# Patient Record
Sex: MALE | Race: BLACK OR AFRICAN AMERICAN | Employment: UNEMPLOYED | URBAN - METROPOLITAN AREA
[De-identification: names, ages, dates, MRNs, and addresses within clinical notes are randomized per-mention and may not be internally consistent; named-entity substitution may affect disease eponyms.]

---

## 2017-04-07 ENCOUNTER — LAB ENCOUNTER (OUTPATIENT)
Dept: LAB | Facility: HOSPITAL | Age: 37
End: 2017-04-07
Attending: FAMILY MEDICINE
Payer: MEDICARE

## 2017-04-07 ENCOUNTER — OFFICE VISIT (OUTPATIENT)
Dept: RHEUMATOLOGY | Facility: CLINIC | Age: 37
End: 2017-04-07

## 2017-04-07 VITALS
HEART RATE: 71 BPM | WEIGHT: 175 LBS | HEIGHT: 69 IN | BODY MASS INDEX: 25.92 KG/M2 | SYSTOLIC BLOOD PRESSURE: 122 MMHG | DIASTOLIC BLOOD PRESSURE: 85 MMHG

## 2017-04-07 DIAGNOSIS — M79.10 MYALGIA: ICD-10-CM

## 2017-04-07 DIAGNOSIS — M25.50 POLYARTHRALGIA: Primary | ICD-10-CM

## 2017-04-07 DIAGNOSIS — M25.50 POLYARTHRALGIA: ICD-10-CM

## 2017-04-07 PROCEDURE — 82550 ASSAY OF CK (CPK): CPT

## 2017-04-07 PROCEDURE — 82085 ASSAY OF ALDOLASE: CPT

## 2017-04-07 PROCEDURE — 36415 COLL VENOUS BLD VENIPUNCTURE: CPT

## 2017-04-07 PROCEDURE — 86140 C-REACTIVE PROTEIN: CPT

## 2017-04-07 PROCEDURE — 86039 ANTINUCLEAR ANTIBODIES (ANA): CPT

## 2017-04-07 PROCEDURE — 85652 RBC SED RATE AUTOMATED: CPT

## 2017-04-07 PROCEDURE — G0463 HOSPITAL OUTPT CLINIC VISIT: HCPCS | Performed by: INTERNAL MEDICINE

## 2017-04-07 PROCEDURE — 99204 OFFICE O/P NEW MOD 45 MIN: CPT | Performed by: INTERNAL MEDICINE

## 2017-04-07 PROCEDURE — 86235 NUCLEAR ANTIGEN ANTIBODY: CPT

## 2017-04-07 PROCEDURE — 86038 ANTINUCLEAR ANTIBODIES: CPT

## 2017-04-07 RX ORDER — CLINDAMYCIN PHOSPHATE 10 MG/G
GEL TOPICAL
COMMUNITY
Start: 2017-03-21

## 2017-04-07 RX ORDER — CALCIUM CARBONATE 500(1250)
500 TABLET ORAL DAILY
COMMUNITY

## 2017-04-07 RX ORDER — NYSTATIN 100000 [USP'U]/G
POWDER TOPICAL
COMMUNITY
Start: 2017-01-16

## 2017-04-07 RX ORDER — MULTIVIT-MIN/IRON/FOLIC ACID/K 18-600-40
CAPSULE ORAL DAILY
COMMUNITY

## 2017-04-07 RX ORDER — TRAMADOL HYDROCHLORIDE 50 MG/1
TABLET ORAL 2 TIMES DAILY
COMMUNITY
Start: 2017-03-22

## 2017-04-07 RX ORDER — DOXYCYCLINE HYCLATE 100 MG
TABLET ORAL 2 TIMES DAILY
COMMUNITY
Start: 2017-03-20

## 2017-04-07 RX ORDER — MAG HYDROX/ALUMINUM HYD/SIMETH 400-400-40
SUSPENSION, ORAL (FINAL DOSE FORM) ORAL DAILY
COMMUNITY

## 2017-04-07 NOTE — PATIENT INSTRUCTIONS
1. Check labs- NUSRAT, SSA , SSB  2. Consider gabapentin and lyrica for him   3. Will discuss with dr. Vel Ruby -   4.  Return to clinic in 1month-

## 2017-04-07 NOTE — PROGRESS NOTES
Belkis Stratton is a 31year old male who presents for Patient presents with:  Leg Pain: lower  . HPI:     I had the pleasure of seeing Belkis Stratton on 4/7/2017 for evaluation. He is  A pleasant 31year old who has multiple sclerosis.  He was diagnos 11      History reviewed. No pertinent past medical history. History reviewed. No pertinent past surgical history.    Family History   Problem Relation Age of Onset   • Diabetes Mother    • MS[other] Tabitha Gentle Cousin    • MS[other] [OTHER] Maternal Aunt in hips flexores -   No classic tender points -   EXTREMITIES: no cyanosis, clubbing or edema  NEURO: intact touch, 4/5 ue and le strength    ASSESSMENT AND PLAN:   Roxy Duncan is a 31year old male who presents for Patient presents with:  Leg Pain: low

## 2017-04-13 ENCOUNTER — TELEPHONE (OUTPATIENT)
Dept: RHEUMATOLOGY | Facility: CLINIC | Age: 37
End: 2017-04-13

## 2017-04-14 NOTE — TELEPHONE ENCOUNTER
Left message again for dr. Norris- [phone] - with Amaury Perea test results   - and to ask what the key question was for the consult. Please fax Ed rios's labs to PCP at [phone]  [facility] to fax lab letter to her.

## 2017-05-03 ENCOUNTER — OFFICE VISIT (OUTPATIENT)
Dept: RHEUMATOLOGY | Facility: CLINIC | Age: 37
End: 2017-05-03

## 2017-05-03 VITALS
HEART RATE: 84 BPM | SYSTOLIC BLOOD PRESSURE: 120 MMHG | HEIGHT: 69 IN | WEIGHT: 175 LBS | BODY MASS INDEX: 25.92 KG/M2 | DIASTOLIC BLOOD PRESSURE: 75 MMHG

## 2017-05-03 DIAGNOSIS — G35 MS (MULTIPLE SCLEROSIS) (HCC): ICD-10-CM

## 2017-05-03 DIAGNOSIS — M79.672 PAIN IN BOTH FEET: Primary | ICD-10-CM

## 2017-05-03 DIAGNOSIS — M79.671 PAIN IN BOTH FEET: Primary | ICD-10-CM

## 2017-05-03 DIAGNOSIS — R76.8 POSITIVE ANA (ANTINUCLEAR ANTIBODY): ICD-10-CM

## 2017-05-03 PROCEDURE — G0463 HOSPITAL OUTPT CLINIC VISIT: HCPCS | Performed by: INTERNAL MEDICINE

## 2017-05-03 PROCEDURE — 99214 OFFICE O/P EST MOD 30 MIN: CPT | Performed by: INTERNAL MEDICINE

## 2017-05-03 RX ORDER — GABAPENTIN 300 MG/1
300 CAPSULE ORAL NIGHTLY
Qty: 90 CAPSULE | Refills: 1 | Status: SHIPPED | OUTPATIENT
Start: 2017-05-03 | End: 2017-08-01

## 2017-05-03 NOTE — PROGRESS NOTES
Carlos Alberto Glynn is a 31year old male who presents for Patient presents with:  Joint Pain: leg pain  . HPI:     I had the pleasure of seeing Carlos Alberto Glynn on 4/7/2017 for evaluation. He is  A pleasant 31year old who has multiple sclerosis.  He was [facility] on gabpentin and lyrica before but he is not sure how much it helped him. He felt his toe pain got better a couple of months ago. He started losing more function in 2/2008.    The toe pain has been going on for a while but it hasn't always been going on Onset   • Diabetes Mother    • MS[other] Otto Nathan Cousin    • MS[other] Otto Jac Maternal Aunt    • gout[other] Otto Jac Maternal Aunt    • LUPUS[other] [OTHER] Cousin       Social History:    Smoking Status: Current Every Day Smoker        Packs/Day: 0.00  Y A      Negative Negative   Anti-Sjogren's B      Negative Negative   NUSRAT SCREEN      Negative Moderate Positive (A)   CK       U/L 61   ALDOLASE, SERUM      1.5-8.1 U/L 5.8   C-REACTIVE PROTEIN      0.0-0.9 mg/dL 2.1 (H)   SED RATE      0-15 mm/Hr 13

## 2017-05-03 NOTE — PATIENT INSTRUCTIONS
1. Start gabpentin 300mg at night - ok to titrate to twice a day or three times a day - per dr. Tobin Ross   2. Return to clinic in 6 months or as needed. Gabapentin capsules or tablets  What is this medicine?   GABAPENTIN (GA ba pen tin) is used to [employer] This medicine may also interact with the following medications:  · alcohol  · antacids  · antihistamines for allergy, cough and cold  · certain medicines for anxiety or sleep  · certain medicines for depression or psychotic disturbances  · homatropine; hyd Visit your doctor or health care professional for regular checks on your progress. You may want to keep a record at home of how you feel your condition is responding to treatment.  You may want to share this information with your doctor or health care profe NOTE:This sheet is a summary. It may not cover all possible information. If you have questions about this medicine, talk to your doctor, pharmacist, or health care provider.  Copyright© 2016 [employer]

## 2017-10-09 ENCOUNTER — NEW PATIENT (OUTPATIENT)
Dept: URBAN - METROPOLITAN AREA CLINIC 30 | Facility: CLINIC | Age: 37
End: 2017-10-09
Payer: COMMERCIAL

## 2017-10-09 DIAGNOSIS — H46.9 UNSPECIFIED OPTIC NEURITIS: Primary | ICD-10-CM

## 2017-10-09 DIAGNOSIS — H43.813 VITREOUS DEGENERATION, BILATERAL: ICD-10-CM

## 2017-10-09 PROCEDURE — 92134 CPTRZ OPH DX IMG PST SGM RTA: CPT | Performed by: OPHTHALMOLOGY

## 2017-10-09 PROCEDURE — 92004 COMPRE OPH EXAM NEW PT 1/>: CPT | Performed by: OPHTHALMOLOGY

## 2017-10-09 ASSESSMENT — INTRAOCULAR PRESSURE
OS: 11
OD: 10

## 2018-05-30 ENCOUNTER — FOLLOW UP ESTABLISHED (OUTPATIENT)
Dept: URBAN - METROPOLITAN AREA CLINIC 30 | Facility: CLINIC | Age: 38
End: 2018-05-30
Payer: COMMERCIAL

## 2018-05-30 DIAGNOSIS — H47.293 OTHER OPTIC ATROPHY, BILATERAL: Primary | ICD-10-CM

## 2018-05-30 PROCEDURE — 92004 COMPRE OPH EXAM NEW PT 1/>: CPT | Performed by: OPTOMETRIST

## 2018-05-30 PROCEDURE — 92133 CPTRZD OPH DX IMG PST SGM ON: CPT | Performed by: OPTOMETRIST

## 2018-05-30 ASSESSMENT — VISUAL ACUITY
OS: 20/400
OD: 20/300

## 2018-05-30 ASSESSMENT — INTRAOCULAR PRESSURE
OS: 13
OD: 11

## 2019-04-01 ENCOUNTER — OFFICE VISIT (OUTPATIENT)
Dept: URBAN - METROPOLITAN AREA CLINIC 40 | Facility: CLINIC | Age: 39
End: 2019-04-01
Payer: COMMERCIAL

## 2019-04-01 DIAGNOSIS — G35 MULTIPLE SCLEROSIS: ICD-10-CM

## 2019-04-01 DIAGNOSIS — H55.09 OTHER FORM OF NYSTAGMUS: Primary | ICD-10-CM

## 2019-04-01 PROCEDURE — 99204 OFFICE O/P NEW MOD 45 MIN: CPT | Performed by: OPTOMETRIST

## 2019-04-01 ASSESSMENT — INTRAOCULAR PRESSURE
OD: 17
OS: 16

## 2019-04-01 NOTE — IMPRESSION/PLAN
Impression: Multiple sclerosis: G35. Plan: Discussed diagnosis in detail with patient. Will continue to observe condition and or symptoms.

## 2019-04-01 NOTE — IMPRESSION/PLAN
Impression: Other form of nystagmus: H55.09. Plan: Discussed diagnosis in detail with patient. Will continue to observe condition and or symptoms.

## 2021-05-27 ENCOUNTER — HOSPITAL ENCOUNTER (OUTPATIENT)
Facility: CLINIC | Age: 41
Setting detail: OBSERVATION
Discharge: NURSING, REHAB OR LONG-TERM CARE | End: 2021-06-21
Attending: EMERGENCY MEDICINE | Admitting: INTERNAL MEDICINE
Payer: COMMERCIAL

## 2021-05-27 DIAGNOSIS — G35 MULTIPLE SCLEROSIS (H): ICD-10-CM

## 2021-05-27 LAB
ALBUMIN UR-MCNC: 100 MG/DL
ANION GAP SERPL CALCULATED.3IONS-SCNC: 5 MMOL/L (ref 3–14)
APPEARANCE UR: ABNORMAL
BASOPHILS # BLD AUTO: 0 10E9/L (ref 0–0.2)
BASOPHILS NFR BLD AUTO: 0.6 %
BILIRUB UR QL STRIP: NEGATIVE
BUN SERPL-MCNC: 11 MG/DL (ref 7–30)
CALCIUM SERPL-MCNC: 9.5 MG/DL (ref 8.5–10.1)
CHLORIDE SERPL-SCNC: 102 MMOL/L (ref 94–109)
CO2 SERPL-SCNC: 31 MMOL/L (ref 20–32)
COLOR UR AUTO: YELLOW
CREAT SERPL-MCNC: 0.98 MG/DL (ref 0.66–1.25)
DIFFERENTIAL METHOD BLD: ABNORMAL
EOSINOPHIL # BLD AUTO: 0.5 10E9/L (ref 0–0.7)
EOSINOPHIL NFR BLD AUTO: 11.2 %
ERYTHROCYTE [DISTWIDTH] IN BLOOD BY AUTOMATED COUNT: 18.3 % (ref 10–15)
GFR SERPL CREATININE-BSD FRML MDRD: >90 ML/MIN/{1.73_M2}
GLUCOSE SERPL-MCNC: 66 MG/DL (ref 70–99)
GLUCOSE UR STRIP-MCNC: NEGATIVE MG/DL
HCT VFR BLD AUTO: 54.9 % (ref 40–53)
HGB BLD-MCNC: 16.8 G/DL (ref 13.3–17.7)
HGB UR QL STRIP: ABNORMAL
HYALINE CASTS #/AREA URNS LPF: 2 /LPF (ref 0–2)
IMM GRANULOCYTES # BLD: 0 10E9/L (ref 0–0.4)
IMM GRANULOCYTES NFR BLD: 0.9 %
INTERPRETATION ECG - MUSE: NORMAL
KETONES UR STRIP-MCNC: NEGATIVE MG/DL
LABORATORY COMMENT REPORT: NORMAL
LEUKOCYTE ESTERASE UR QL STRIP: ABNORMAL
LYMPHOCYTES # BLD AUTO: 0.5 10E9/L (ref 0.8–5.3)
LYMPHOCYTES NFR BLD AUTO: 11.6 %
MCH RBC QN AUTO: 25.5 PG (ref 26.5–33)
MCHC RBC AUTO-ENTMCNC: 30.6 G/DL (ref 31.5–36.5)
MCV RBC AUTO: 83 FL (ref 78–100)
MONOCYTES # BLD AUTO: 0.4 10E9/L (ref 0–1.3)
MONOCYTES NFR BLD AUTO: 8.6 %
MUCOUS THREADS #/AREA URNS LPF: PRESENT /LPF
NEUTROPHILS # BLD AUTO: 3.1 10E9/L (ref 1.6–8.3)
NEUTROPHILS NFR BLD AUTO: 67.1 %
NITRATE UR QL: NEGATIVE
NRBC # BLD AUTO: 0 10*3/UL
NRBC BLD AUTO-RTO: 0 /100
NT-PROBNP SERPL-MCNC: 17 PG/ML (ref 0–450)
PH UR STRIP: 5.5 PH (ref 5–7)
PLATELET # BLD AUTO: 373 10E9/L (ref 150–450)
POTASSIUM SERPL-SCNC: 3.8 MMOL/L (ref 3.4–5.3)
RBC # BLD AUTO: 6.59 10E12/L (ref 4.4–5.9)
RBC #/AREA URNS AUTO: >182 /HPF (ref 0–2)
SARS-COV-2 RNA RESP QL NAA+PROBE: NEGATIVE
SODIUM SERPL-SCNC: 138 MMOL/L (ref 133–144)
SOURCE: ABNORMAL
SP GR UR STRIP: 1.03 (ref 1–1.03)
SPECIMEN SOURCE: NORMAL
SQUAMOUS #/AREA URNS AUTO: <1 /HPF (ref 0–1)
UROBILINOGEN UR STRIP-MCNC: 0 MG/DL (ref 0–2)
WBC # BLD AUTO: 4.7 10E9/L (ref 4–11)
WBC #/AREA URNS AUTO: >182 /HPF (ref 0–5)
WBC CLUMPS #/AREA URNS HPF: PRESENT /HPF

## 2021-05-27 PROCEDURE — 99285 EMERGENCY DEPT VISIT HI MDM: CPT | Mod: 25

## 2021-05-27 PROCEDURE — 99219 PR INITIAL OBSERVATION CARE,LEVEL II: CPT | Performed by: INTERNAL MEDICINE

## 2021-05-27 PROCEDURE — G0378 HOSPITAL OBSERVATION PER HR: HCPCS

## 2021-05-27 PROCEDURE — 83880 ASSAY OF NATRIURETIC PEPTIDE: CPT | Performed by: EMERGENCY MEDICINE

## 2021-05-27 PROCEDURE — 81001 URINALYSIS AUTO W/SCOPE: CPT | Performed by: EMERGENCY MEDICINE

## 2021-05-27 PROCEDURE — 85025 COMPLETE CBC W/AUTO DIFF WBC: CPT | Performed by: EMERGENCY MEDICINE

## 2021-05-27 PROCEDURE — 250N000013 HC RX MED GY IP 250 OP 250 PS 637: Performed by: INTERNAL MEDICINE

## 2021-05-27 PROCEDURE — 80048 BASIC METABOLIC PNL TOTAL CA: CPT | Performed by: EMERGENCY MEDICINE

## 2021-05-27 PROCEDURE — G0463 HOSPITAL OUTPT CLINIC VISIT: HCPCS

## 2021-05-27 PROCEDURE — 93005 ELECTROCARDIOGRAM TRACING: CPT

## 2021-05-27 PROCEDURE — C9803 HOPD COVID-19 SPEC COLLECT: HCPCS

## 2021-05-27 PROCEDURE — 87635 SARS-COV-2 COVID-19 AMP PRB: CPT | Performed by: EMERGENCY MEDICINE

## 2021-05-27 RX ORDER — LEVETIRACETAM 500 MG/1
500 TABLET ORAL EVERY 12 HOURS
COMMUNITY

## 2021-05-27 RX ORDER — AMOXICILLIN 250 MG
1 CAPSULE ORAL 2 TIMES DAILY PRN
Status: DISCONTINUED | OUTPATIENT
Start: 2021-05-27 | End: 2021-06-21 | Stop reason: HOSPADM

## 2021-05-27 RX ORDER — POTASSIUM CHLORIDE 750 MG/1
10 CAPSULE, EXTENDED RELEASE ORAL DAILY
Status: DISCONTINUED | OUTPATIENT
Start: 2021-05-27 | End: 2021-05-27

## 2021-05-27 RX ORDER — DOXYCYCLINE 100 MG/1
100 CAPSULE ORAL DAILY
Status: DISCONTINUED | OUTPATIENT
Start: 2021-05-27 | End: 2021-06-12

## 2021-05-27 RX ORDER — AMOXICILLIN 250 MG
2 CAPSULE ORAL 2 TIMES DAILY PRN
Status: DISCONTINUED | OUTPATIENT
Start: 2021-05-27 | End: 2021-06-21 | Stop reason: HOSPADM

## 2021-05-27 RX ORDER — LEVETIRACETAM 500 MG/1
500 TABLET ORAL EVERY 12 HOURS
Status: DISCONTINUED | OUTPATIENT
Start: 2021-05-27 | End: 2021-06-06

## 2021-05-27 RX ORDER — FUROSEMIDE 20 MG
20 TABLET ORAL DAILY
Status: DISCONTINUED | OUTPATIENT
Start: 2021-05-27 | End: 2021-06-21 | Stop reason: HOSPADM

## 2021-05-27 RX ORDER — NALOXONE HYDROCHLORIDE 0.4 MG/ML
0.4 INJECTION, SOLUTION INTRAMUSCULAR; INTRAVENOUS; SUBCUTANEOUS
Status: DISCONTINUED | OUTPATIENT
Start: 2021-05-27 | End: 2021-06-21 | Stop reason: HOSPADM

## 2021-05-27 RX ORDER — DOXYCYCLINE 100 MG/1
100 CAPSULE ORAL DAILY
COMMUNITY

## 2021-05-27 RX ORDER — BACLOFEN 10 MG/1
10 TABLET ORAL 2 TIMES DAILY
Status: ON HOLD | COMMUNITY
End: 2021-06-21

## 2021-05-27 RX ORDER — OXYCODONE HYDROCHLORIDE 5 MG/1
5-10 TABLET ORAL
Status: DISCONTINUED | OUTPATIENT
Start: 2021-05-27 | End: 2021-06-10

## 2021-05-27 RX ORDER — LACTULOSE 10 G/15ML
30 SOLUTION ORAL; RECTAL DAILY
COMMUNITY

## 2021-05-27 RX ORDER — ACETAMINOPHEN 650 MG/1
650 SUPPOSITORY RECTAL EVERY 4 HOURS PRN
Status: DISCONTINUED | OUTPATIENT
Start: 2021-05-27 | End: 2021-06-21 | Stop reason: HOSPADM

## 2021-05-27 RX ORDER — NAPROXEN 500 MG/1
500 TABLET ORAL EVERY 12 HOURS PRN
COMMUNITY

## 2021-05-27 RX ORDER — DULOXETIN HYDROCHLORIDE 30 MG/1
30 CAPSULE, DELAYED RELEASE ORAL DAILY
Status: DISCONTINUED | OUTPATIENT
Start: 2021-05-27 | End: 2021-06-21 | Stop reason: HOSPADM

## 2021-05-27 RX ORDER — FINGOLIMOD HYDROCHLORIDE 0.5 MG/1
0.5 CAPSULE ORAL DAILY
Status: DISCONTINUED | OUTPATIENT
Start: 2021-05-27 | End: 2021-06-21 | Stop reason: HOSPADM

## 2021-05-27 RX ORDER — ONDANSETRON 4 MG/1
4 TABLET, ORALLY DISINTEGRATING ORAL EVERY 6 HOURS PRN
Status: DISCONTINUED | OUTPATIENT
Start: 2021-05-27 | End: 2021-06-21 | Stop reason: HOSPADM

## 2021-05-27 RX ORDER — NALOXONE HYDROCHLORIDE 0.4 MG/ML
0.2 INJECTION, SOLUTION INTRAMUSCULAR; INTRAVENOUS; SUBCUTANEOUS
Status: DISCONTINUED | OUTPATIENT
Start: 2021-05-27 | End: 2021-06-21 | Stop reason: HOSPADM

## 2021-05-27 RX ORDER — GABAPENTIN 600 MG/1
1200 TABLET ORAL EVERY 8 HOURS
Status: DISCONTINUED | OUTPATIENT
Start: 2021-05-27 | End: 2021-06-02

## 2021-05-27 RX ORDER — FUROSEMIDE 20 MG
20 TABLET ORAL DAILY
COMMUNITY

## 2021-05-27 RX ORDER — ONDANSETRON 2 MG/ML
4 INJECTION INTRAMUSCULAR; INTRAVENOUS EVERY 6 HOURS PRN
Status: DISCONTINUED | OUTPATIENT
Start: 2021-05-27 | End: 2021-06-21 | Stop reason: HOSPADM

## 2021-05-27 RX ORDER — OXYCODONE HYDROCHLORIDE 5 MG/1
5 TABLET ORAL 3 TIMES DAILY
Status: ON HOLD | COMMUNITY
End: 2021-06-21

## 2021-05-27 RX ORDER — POTASSIUM CHLORIDE 750 MG/1
10 TABLET, EXTENDED RELEASE ORAL DAILY
Status: DISCONTINUED | OUTPATIENT
Start: 2021-05-27 | End: 2021-06-21 | Stop reason: HOSPADM

## 2021-05-27 RX ORDER — POTASSIUM CHLORIDE 750 MG/1
10 CAPSULE, EXTENDED RELEASE ORAL DAILY
Status: ON HOLD | COMMUNITY
End: 2021-06-21

## 2021-05-27 RX ORDER — NALTREXONE HYDROCHLORIDE 50 MG/1
4.5 TABLET, FILM COATED ORAL DAILY
Status: ON HOLD | COMMUNITY
End: 2021-05-27

## 2021-05-27 RX ORDER — BACLOFEN 10 MG/1
10 TABLET ORAL 2 TIMES DAILY
Status: DISCONTINUED | OUTPATIENT
Start: 2021-05-27 | End: 2021-06-05

## 2021-05-27 RX ORDER — FINGOLIMOD HCL 0.5 MG/1
0.5 CAPSULE ORAL DAILY
COMMUNITY

## 2021-05-27 RX ORDER — LACTULOSE 10 G/15ML
30 SOLUTION ORAL DAILY
Status: DISCONTINUED | OUTPATIENT
Start: 2021-05-27 | End: 2021-06-21 | Stop reason: HOSPADM

## 2021-05-27 RX ORDER — GABAPENTIN 600 MG/1
1200 TABLET ORAL EVERY 8 HOURS
Status: ON HOLD | COMMUNITY
End: 2021-06-21

## 2021-05-27 RX ORDER — OXYCODONE HYDROCHLORIDE 5 MG/1
5 TABLET ORAL 3 TIMES DAILY
Status: DISCONTINUED | OUTPATIENT
Start: 2021-05-27 | End: 2021-06-21 | Stop reason: HOSPADM

## 2021-05-27 RX ORDER — ACETAMINOPHEN 325 MG/1
650 TABLET ORAL EVERY 4 HOURS PRN
Status: DISCONTINUED | OUTPATIENT
Start: 2021-05-27 | End: 2021-06-21 | Stop reason: HOSPADM

## 2021-05-27 RX ORDER — BISACODYL 10 MG
10 SUPPOSITORY, RECTAL RECTAL DAILY PRN
Status: DISCONTINUED | OUTPATIENT
Start: 2021-05-27 | End: 2021-06-21 | Stop reason: HOSPADM

## 2021-05-27 RX ORDER — DULOXETIN HYDROCHLORIDE 30 MG/1
30 CAPSULE, DELAYED RELEASE ORAL DAILY
COMMUNITY

## 2021-05-27 RX ADMIN — OXYCODONE HYDROCHLORIDE 5 MG: 5 TABLET ORAL at 15:57

## 2021-05-27 RX ADMIN — FINGOLIMOD HCL 0.5 MG: 0.5 CAPSULE ORAL at 17:23

## 2021-05-27 RX ADMIN — FUROSEMIDE 20 MG: 20 TABLET ORAL at 15:57

## 2021-05-27 RX ADMIN — DOXYCYCLINE 100 MG: 100 CAPSULE ORAL at 15:57

## 2021-05-27 RX ADMIN — OXYCODONE HYDROCHLORIDE 5 MG: 5 TABLET ORAL at 21:18

## 2021-05-27 RX ADMIN — DULOXETINE HYDROCHLORIDE 30 MG: 30 CAPSULE, DELAYED RELEASE PELLETS ORAL at 15:57

## 2021-05-27 RX ADMIN — BACLOFEN 10 MG: 10 TABLET ORAL at 21:17

## 2021-05-27 RX ADMIN — GABAPENTIN 1200 MG: 600 TABLET, FILM COATED ORAL at 17:23

## 2021-05-27 RX ADMIN — LACTULOSE 30 ML: 10 SOLUTION ORAL at 17:22

## 2021-05-27 RX ADMIN — GABAPENTIN 1200 MG: 600 TABLET, FILM COATED ORAL at 23:52

## 2021-05-27 RX ADMIN — ACETAMINOPHEN 650 MG: 325 TABLET, FILM COATED ORAL at 14:38

## 2021-05-27 RX ADMIN — POTASSIUM CHLORIDE 10 MEQ: 750 TABLET, EXTENDED RELEASE ORAL at 17:23

## 2021-05-27 RX ADMIN — LEVETIRACETAM 500 MG: 500 TABLET, FILM COATED ORAL at 17:23

## 2021-05-27 SDOH — HEALTH STABILITY: MENTAL HEALTH: HOW OFTEN DO YOU HAVE A DRINK CONTAINING ALCOHOL?: NEVER

## 2021-05-27 ASSESSMENT — ENCOUNTER SYMPTOMS
NAUSEA: 0
DIARRHEA: 0
CONSTIPATION: 0
WOUND: 1
FEVER: 0
BLOOD IN STOOL: 0
VOMITING: 0
COUGH: 0
MYALGIAS: 1

## 2021-05-27 NOTE — PLAN OF CARE
Patient is alert and oriented x4.  Came form [place], was brought here from airport for complains of generalized pain and waiting for nursing home placement. PIV on the left WDL. Skin check done and skin issues documented and verified by another nurse.New FC   form ER, tip of the penis has bloody/ yellowish (pus like) drainage. Wipe all over the body, patient smells bad and used up 3 packs of wipes. Skin is dirty and wipes turns to black after few wipes to the body. Total cares, A2 with lift. Needs to be repositioned  Q2 hours to prevent further skin breakdown.  Pleasant and cooperative. To continue to monitor.

## 2021-05-27 NOTE — PROGRESS NOTES
RECEIVING UNIT ED HANDOFF REVIEW    ED Nurse Handoff Report was reviewed by: Ekta Orozco RN on May 27, 2021 at 4:01 AM

## 2021-05-27 NOTE — CONSULTS
[facility] Nurse Inpatient Wound Assessment     Reason for consultation: Evaluate and treat coccyx, posterior thighs, heels, left calf, penile meatus      Assessment  Coccyx: basically intact, with scattered superficial friction/shear injuries that are mostly resurfaced.  There is likely pressure component as well, but no obvious pressure injuries present at this time.      Posterior upper thighs: superficial shearing injuries likely from briefs, L>R  Right heel: Stage 2 vs healing Stage 3 CAPI; shallow and clean  Left heel: intact, newly resurfaced tissue, evidence of prior pressure injury  Left lateral calf: old trauma injury that reopens per pt; scarred with minimal open tissue at present  Penile meatus: mild enlargement of meatal opening from chronic catheter; no wounds present but there is moderate yellowish drainage coming from meatus    Pt has MS and is wheel-chair bound.  At risk for further skin breakdown.  Consider Pulsate mattress if extended stay expected.  Prevalon boots ordered for heels.     Treatment Plan  Coccyx/sacrum: every other day and prn:  1.  Swab skin with Cavilon no-sting barrier wipes, let dry  2.  Apply Mepilex Sacral, stay well above perianal area  3.  PIP measures. Consider Pulsate mattress if extended stay expected or if skin deteriorates.     Posterior thighs: every other day and prn until healed:  1.  Cleanse with MicroKlenz  2.  Mepilex 4x4s      Left calf: every 3 days and prn:   1.  Cleanse with MicroKlenz  2.  Apply Optifoam Gentle Border foam dressing    Right heel: every other day and prn:  1.  Cleanse with MicroKlenz  2.  Swab periwound with skin prep, let dry  3.  Apply small piece of Aquacel Ag to open area  4.  Cover with Mepilex 4x4    Left heel:  Cleanse with bath wipes, then apply Sween 24 cream.  Apply Prevalon boots to bilateral heels when in bed.     Penile meatus: no wound cares needed; continue meticulous catheter and perineal cares  and ensure catheter is secured appropriately, not pulling on meatus    Orders Written  Recommended provider order: None, at this time  WOC Nurse follow-up plan: weekly  Nursing to notify the Provider(s) and re-consult the WOC Nurse if wound(s) deteriorates or new skin concern.    Patient History  According to provider note(s):  Rahul Ruffin is a 41 year old male who presents with need for placement     MS  Total cares, was living in care facility in [place] but wants to be placed here. Just got off plane and came to ED for placement. Reports no acute issues. Labs normal.  Mom says patient usually gets care at [facility] and [place].  I cannot access any records from him through epic.    Objective Data  Containment of urine/stool: Incontinence Protocol, Brief and Indwelling catheter    Active Diet Order:  Orders Placed This Encounter      Mechanical/Dental Soft Diet    Output:   No intake/output data recorded.    Risk Assessment:   Sensory Perception: 2-->very limited  Moisture: 4-->rarely moist  Activity: 2-->chairfast  Mobility: 2-->very limited  Nutrition: 3-->adequate  Friction and Shear: 1-->problem  Nick Score: 14                          Labs:   Recent Labs   Lab 05/27/21  0133   HGB 16.8   WBC 4.7       Physical Exam  Skin inspection: focused coccyx, posterior thighs, lower legs, heels, penis    Wound Location:  Coccyx   Date of last photo:  5-27-21      Wound History: per report, pt with poor hygiene on admission.  Pt bed/chair bound but can help with turns, needs assist x 1-2.  Chronic payne in place but incontinent of stool.   Measurements (length x width x depth, in cm): scattered pink areas 0.5cm or less, no depth  Wound Base: pink fragile newly resurfaced tissue  Palpation of the wound bed: normal   Periwound skin: intact, darker pigmentation to buttocks, dry and flaky  Color: normal to darker brown  Temperature: normal   Drainage: none  Odor: none   Pain: denies      Wound Location:   Posterior thighs  Date of last photo:  5-27-21      Wound History: present on admission, likely from briefs  Measurements (length x width x depth, in cm):  approx 4 x 1 x 0.1cm to left thigh, 1 x 3 x 0cm to right thigh  Wound Base: pink semi-moist tissue  Palpation of the wound bed: normal   Periwound skin: intact  Color: normal and consistent with surrounding tissue  Temperature: normal   Drainage: scant  Description of drainage: serosanguinous  Odor: none  Pain: minimal    Wound Location:  Right heel  Date of last photo:  5-27-21      Wound History: present on admission.  Pt can slightly move legs but cannot easily reposition them on his own.    Measurements (length x width x depth, in cm):  approx 2 x 1.5 x 0.2cm open area and 4 x 5cm general healing area/ debrided prior blister  Wound Base: pink-red moist friable  Tunneling: N/A  Undermining: N/A  Palpation of the wound bed: normal   Periwound skin: pink newly resurfaced tissue, remnants of blistered tissue at edges  Color: pink  Temperature: normal   Drainage: small  Description of drainage: bloody  Odor: none  Pain: mild      Wound Location:  Left heel  Date of last photo:  5-27-21      Wound History: present on admission  Measurements (length x width x depth, in cm): approx 2 x 2cm intact  Wound Base: intact scabbed/calloused firm tissue, looks newly healed over  Tunneling: N/A  Undermining: N/A  Palpation of the wound bed: firm   Periwound skin: flaky, scabby, dry, peely  Color: normal and consistent with surrounding tissue  Temperature: normal   Drainage: none  Odor: none  Pain: denies      Wound Location:  Left proximal posterior/lateral calf  Date of last photo:  5-27-21      Wound History: pt states is from minor trauma some years ago and area has had trouble staying healed  Measurements (length x width x depth, in cm):  approx 5 x 5cm, mostly intact, scant areas of depth 0.1cm  Wound Base: mostly pink-purple-brown scar tissue with scant pink moist  tissue  Palpation of the wound bed: normal   Periwound skin: dry/scaly  Color: normal and consistent with surrounding tissue  Temperature: normal   Drainage: scant  Description of drainage: serosanguinous  Odor: none  Pain: minimal    Interventions  Current support surface: Standard  Atmos Air mattress; consider Pulsate if extended stay or if skin deteriorates  Current off-loading measures: Pillows    Visual inspection of wound(s) completed  Wound Care: done per plan of care  Supplies: reviewed; ordered Prevalon boots  Education provided: importance of repositioning, plan of care, wound progress, Infection prevention , Moisture management, Hygiene and Off-loading pressure  Discussed plan of care with Patient and Nurse    Annette Dove RN, CWOCN

## 2021-05-27 NOTE — ED PROVIDER NOTES
History   Chief Complaint:  Foot Swelling     HPI   Rahul Ruffin is a 41 year old male with history of MS with an indwelling payne who presents with foot swelling. The patient reports bilateral foot swelling for the past 8 months, with increased pain to his feet tonight. He also notes some body aches, and chronic wounds to his upper left lateral calf and right heel, as well as cloudy urine he states is baseline for him. His mother reports he currently resides in a nursing home in [place], and now wants to be placed in a home here. They just flew in today, and were told the best way to accomplish this placement would be through the ED. They deny any fever, cough, nausea, vomiting, or abnormal BM. He is fully vaccinated against COVID. Denies any recent medication changes. No history of heart issues. No alcohol or drug use.    Review of Systems   Constitutional: Negative for fever.   Respiratory: Negative for cough.    Cardiovascular: Positive for leg swelling (foot).   Gastrointestinal: Negative for blood in stool, constipation, diarrhea, nausea and vomiting.   Genitourinary:        Cloudy urine   Musculoskeletal: Positive for myalgias.   Skin: Positive for wound.   All other systems reviewed and are negative.      Allergies:  The patient has no known allergies.     Medications:  Gabapentin  Naprosyn  Oxycodone  Keppra  Doxycycline   Gilenya    Past Medical History:    Trichomonal cystitis  Cognitive deficits  MS  Nystagmus   Ataxia     Social History:  Patient presents with mother at bedside.  No alcohol or drug use.    Physical Exam     Patient Vitals for the past 24 hrs:   BP Temp Pulse Resp SpO2   05/27/21 0300 (!) 129/95 -- 65 -- 100 %   05/27/21 0200 (!) 129/105 -- 65 -- 99 %   05/27/21 0154 -- -- -- 12 --   05/27/21 0152 -- -- -- -- 98 %   05/27/21 0150 (!) 136/106 -- 65 -- --   05/27/21 0100 (!) 142/107 98.1  F (36.7  C) 73 -- --       Physical Exam  SKIN:  Warm, dry.  Bandage skin ulcerations of the bilateral  lower extremities at the left lateral upper calf and the other of the right heel.  No erythema about these skin ulcerations/lesions.  HEMATOLOGIC/IMMUNOLOGIC/LYMPHATIC:  No pallor.  Bilateral lower extremity edema at the feet.  No lymphangitic streaking of the lower extremities.  EYES:  Conjunctivae normal.  CARDIOVASCULAR:  Regular rate and rhythm.  No murmur.  RESPIRATORY:  No respiratory distress, breath sounds equal and normal.  GASTROINTESTINAL:  Soft, nontender abdomen with active bowel sounds.  No distention.  GENITOURINARY: Indwelling Helm catheter draining cloudy yellow urine with debris within the catheter.  No clots or blood.  MUSCULOSKELETAL: Normal body habitus.  NEUROLOGIC:  Alert, conversant.  PSYCHIATRIC:  Normal mood.    Emergency Department Course     ECG  ECG taken at 0214, ECG read at 0248  NSR, Possible Anterior infarct, age undetermined    Rate 65 bpm. WV interval 162 ms. QRS duration 96 ms. QT/QTc 404/420 ms. P-R-T axes 63 63 47.     Laboratory:    CBC: WBC: 4.7, HGB: 16.8, PLT: 373  BMP: Glucose 66 (L), o/w WNL (Creatinine: 0.98)    BNP: 17    UA: Blood: Large, Protein Albumin: 100, Leukocyte Esterase: Large, WBC: >182 (H), WBC clumps: Present, RBC: >182 (H), Mucous: Present, o/w Negative    Asymptomatic COVID-19 PCR: Negative     Emergency Department Course:    Reviewed:  I reviewed the patient's nursing notes, vitals, past medical records, Care Everywhere.     Assessments:  0113    I evaluated the patient and performed an exam as above.    0335    I updated the patient on results and discussed plan of care.    Consults:   0330    I spoke with Dr. Moser of the Hospitalist service regarding patient's presentation, findings, and plan of care.    Disposition:  The patient was admitted to the hospital under the care of Dr. Moser.     Impression & Plan     Medical Decision Making:  This patient presents directly from the airport after a flight from his residence in [facility]. He traveled  with his mother who is requesting he be place as he wanted to move here closer to friends and other family. It sounds there are no new acute physical concerns. He has been dealing with LE swelling for many years and has been treated for MS in a nursing home setting. No reported medication changes. Testing was relatively unrevealing and his catheter was draining cloudy yellow urine with a fair amount of debris within the catheter. Of course the test resulted abnormal but likely just colonization and contaminants. He lacks fever. I doubt UTI. Consulted Dr. Moser with the hostpitalist service regarding admission for observation and ultimate placement as he is completely bed-bound and needs full cares. His mother cannot take care of him giving his needs and condition.    Covid-19  Rahul Ruffin was evaluated during a global COVID-19 pandemic, which necessitated consideration that the patient might be at risk for infection with the SARS-CoV-2 virus that causes COVID-19.   Applicable protocols for evaluation were followed during the patient's care.   COVID-19 was considered as part of the patient's evaluation. The plan for testing is:  a test was obtained during this visit.    Diagnosis:    ICD-10-CM    1. Multiple sclerosis (H)  G35        Scribe Disclosure:  Adele DUMONT, am serving as a scribe at 1:06 AM on 5/27/2021 to document services personally performed by Breezy Salazar MD based on my observations and the provider's statements to me.      Breezy Salazar MD  05/27/21 9098

## 2021-05-27 NOTE — ED NOTES
Patient came in with a payne catheter in place.sediments noted in his payne bag. Patient does not know how long it has been in place. He is here for nursing home placement

## 2021-05-27 NOTE — ED NOTES
Urethral catheter changed. Previous catheter had growth at the tip and the balloon water was green. Waiting for enough urine to collect UA.

## 2021-05-27 NOTE — ED NOTES
[facility]  ED Nurse Handoff Report    ED Chief complaint: Leg Swelling      ED Diagnosis:   Final diagnoses:   None       Code Status: unknown     Allergies: No Known Allergies    Patient Story: patient is a 41 year old male with HX of MS,an indwelling payne and bilateral feet swelling. He stated he has the swelling for 8 months but now has increase pain all over. He flew in to night from [place] where he was a patient at a nursing home. He is here for nursing home placement. He has pressure sore on his right heel and a wound noted to his left upper leg. A new payne was placed here in the emergency room.  Focused Assessment:  See above    Treatments and/or interventions provided: labs  Patient's response to treatments and/or interventions: ongoing    To be done/followed up on inpatient unit:  .    Does this patient have any cognitive concerns?:.    Activity level - Baseline/Home:  Total Care  Activity Level - Current:   Total Care    Patient's Preferred language: Data Unavailable   Needed?: No    Isolation: None  Infection: Not Applicable  Patient tested for COVID 19 prior to admission: YES  Bariatric?: No    Vital Signs:   Vitals:    05/27/21 0150 05/27/21 0152 05/27/21 0154 05/27/21 0200   BP: (!) 136/106   (!) 129/105   Pulse: 65   65   Resp:   12    Temp:       SpO2:  98%  99%       Cardiac Rhythm:     Was the PSS-3 completed: yes  What interventions are required if any?               Family Comments: mother at bedside  OBS brochure/video discussed/provided to patient/family: No              Name of person given brochure if not patient: .              Relationship to patient: .    For the majority of the shift this patient's behavior was Green.   Behavioral interventions performed were ..    ED NURSE PHONE NUMBER: [phone]

## 2021-05-27 NOTE — H&P
[facility]    History and Physical  Hospitalist       Date of Admission:  5/27/2021  Date of Service (when I saw the patient): 05/27/21    Assessment & Plan   Rahul Ruffin is a 41 year old male who presents with need for placement    MS  Total cares, was living in care facility in [place] but wants to be placed here. Just got off plane and came to ED for placement. Reports no acute issues. Labs normal.  Mom says patient usually gets care at [facility] and [place].  I cannot access any records from him through epic.  - placement  - [facility] soft diet  - prn analgesics  - await med rec  -Obtain medical records    Chronic indwelling payne  UA grossly infected, may be chronic. Afebrile with normal WBC count. Mom states he is currently on abx for UTI  - await med rec  - await urine cultures  - hold abx for now pending aobve    Foot swelling  Present x 8 months.   - await med rec    Chronic wounds  Upper L lateral calf and R heel, states baseline.   - wound cares    COVID-19 negative on admission    DVT Prophylaxis: Pneumatic Compression Devices  Code Status: Full Code    Disposition: Expected discharge in 1-2 days    Paco Moser MD  [phone] (P)  Text Page     Primary Care Physician   Provider Not In System    Chief Complaint   placement    History is obtained from the patient and medical records    History of Present Illness   Rahul Ruffin is a 41 year old male who presents for placement.  Medical records are readily available right now.  He has a history of MS and apparently has been living in a care facility in [place] for the last 6 7 years.  He desires to return to [place] so he and his mom got on a plane to come back.  He was told that going to the emergency department would be the easiest way to get placement to so they immediately came here.  He currently has no acute complaints.  He states he has no chest pain or shortness of breath.  Denies any nausea or vomiting.   Denies any abdominal pain.  States he has pain in his buttocks but this is not new.  Mom states he is also had longstanding edema in his lower extremities but this is not new or worse either.    Past Medical History    I have reviewed this patient's medical history and updated it with pertinent information if needed.   MS    Past Surgical History   I have reviewed this patient's surgical history and updated it with pertinent information if needed.  History reviewed. No pertinent surgical history.    Prior to Admission Medications   Awaiting review, include gabapentin    Allergies   No Known Allergies    Social History   I have reviewed this patient's social history and updated it with pertinent information if needed. Rahul Ruffin  reports that he has never smoked. He does not have any smokeless tobacco history on file. He reports that he does not drink alcohol or use drugs.    Family History   I have reviewed this patient's family history and updated it with pertinent information if needed.   History reviewed. No pertinent family history.    Review of Systems   The 10 point Review of Systems is negative other than noted in the HPI or here.     Physical Exam   Temp: 98.1  F (36.7  C)   BP: (!) 129/95 Pulse: 65   Resp: 12 SpO2: 100 %      Vital Signs with Ranges  0 lbs 0 oz    Constitutional: alert, oriented and in no acute distress  Eyes: EOMI, PERRL  HEENT: OP clear, edentulous  Respiratory: CTA B without w/c anteriorly  Cardiovascular: RRR no murmur. 2+ edema to ankles.  GI: soft, nontender, nondistended, BS present  Lymph/Hematologic: no cervical LAD  Genitourinary: indwelling payne  Skin: no rashes or lesions grossly  Musculoskeletal: no deformities or arthritis  Neurologic: CN II-XII, BATES  Psychiatric: mood and affect wnl    Data   Data reviewed today:  I personally reviewed the EKG tracing showing NSR.  Recent Labs   Lab 05/27/21  0133   WBC 4.7   HGB 16.8   MCV 83         POTASSIUM 3.8    CHLORIDE 102   CO2 31   BUN 11   CR 0.98   ANIONGAP 5   SALINAS 9.5   GLC 66*       No results found for this or any previous visit (from the past 24 hour(s)).

## 2021-05-27 NOTE — CONSULTS
Care Management Initial Consult    General Information  Assessment completed with: Patient, Parents, Orquidea  Type of CM/SW Visit: Initial Assessment    Primary Care Provider verified and updated as needed:     Readmission within the last 30 days: no previous admission in last 30 days   Return Category: New Diagnosis  Reason for Consult: discharge planning  Advance Care Planning:            Communication Assessment  Patient's communication style: spoken language (English or Bilingual)    Hearing Difficulty or Deaf: no   Wear Glasses or Blind: yes    Cognitive  Cognitive/Neuro/Behavioral: WDL                      Living Environment:   People in home:       Current living Arrangements:        Able to return to prior arrangements:         Family/Social Support:  Care provided by:    Provides care for:    Marital Status: Single  Parent(s), Other (specify)(family)          Description of Support System: Supportive         Current Resources:   Patient receiving home care services:       Community Resources:    Equipment currently used at home:    Supplies currently used at home:      Employment/Financial:  Employment Status: unemployed        Financial Concerns: insurance inadequate(has an [facility] )   Referral to Financial Counselor: Yes  Finance Comments: FC will assist in filling out online MA applicaiton     Lifestyle & Psychosocial Needs:        Socioeconomic History     Marital status: Single     Spouse name: Not on file     Number of children: Not on file     Years of education: Not on file     Highest education level: Not on file     Tobacco Use     Smoking status: Never Smoker   Substance and Sexual Activity     Alcohol use: Never     Frequency: Never     Drug use: Never       Functional Status:  Prior to admission patient needed assistance:              Mental Health Status:          Chemical Dependency Status:                Values/Beliefs:  Spiritual, Cultural Beliefs, Christian  Practices, Values that affect care:                 Additional Information:  Consult for discharge planning. Writer reviewed chart and spoke to patient. Writer explained that a new MA application will need to be completed for [place] prior to looking for placement. Patient is open to facilities as he has not lived in a LTC in [place] before. Writer asked for permission to reach out to his mom to update her and he is in agreement. Writer asked if patient would like financial counseling to reach out to him or contact his mother and he stated that he would like to complete as much as he could.     Writer contacted patient's mother Trice to discuss. She reports that up until 34, patient was working full time, had his own place and was independent. At that time was when he lost the use of his legs and moved to [facility]. Patient's mom stated that patient wanted to return to [place] as he wanted to be closer to family. Prior to going to [place], patient's mom had been caring for him but came to a point where she was no longer able to care for him. It is unclear why he went to [place] at that time. Trice stated that she has been in contact with [facility] in [place] regarding placement however is unsure if they would accept patient or not.     Call placed to [facility] regarding bed status and message left. Call placed to [facility] with  to assist in filling out an MA application. Assisted patient with dialing the phone to talk to her and complete application. His mom is on facetime during this conversation to assist with questions as needed.     Update: Signature page received from , patient signed it and it was faxed back to financial counseling.     CORWIN Gutierrez

## 2021-05-27 NOTE — PLAN OF CARE
A&O, slight forgetful at times.  VSS, RA.  CMS intact.  Baseline WC bound.  Multiple wounds on buttock, heel, and thigh, see WOC note.  Helm patent and intact.  Tolerating regular diet, needs help with feeding.  Discharge pending AL.

## 2021-05-27 NOTE — PROGRESS NOTES
Hospitalist update note:     Patient resting in bed.  No new complaints.  Having some foot pain which he states is chronic.  No other new complaints.  Resting comfortably.     Plan:   - Awaiting placement  - Resumed PTA medications       Raul Samano DO   Hospitalist

## 2021-05-28 PROCEDURE — 250N000013 HC RX MED GY IP 250 OP 250 PS 637: Performed by: INTERNAL MEDICINE

## 2021-05-28 PROCEDURE — 99207 PR CDG-CODE CATEGORY CHANGED: CPT | Performed by: INTERNAL MEDICINE

## 2021-05-28 PROCEDURE — G0378 HOSPITAL OBSERVATION PER HR: HCPCS

## 2021-05-28 PROCEDURE — 99225 PR SUBSEQUENT OBSERVATION CARE,LEVEL II: CPT | Performed by: INTERNAL MEDICINE

## 2021-05-28 RX ADMIN — GABAPENTIN 1200 MG: 600 TABLET, FILM COATED ORAL at 08:36

## 2021-05-28 RX ADMIN — GABAPENTIN 1200 MG: 600 TABLET, FILM COATED ORAL at 23:59

## 2021-05-28 RX ADMIN — LEVETIRACETAM 500 MG: 500 TABLET, FILM COATED ORAL at 05:49

## 2021-05-28 RX ADMIN — POTASSIUM CHLORIDE 10 MEQ: 750 TABLET, EXTENDED RELEASE ORAL at 08:36

## 2021-05-28 RX ADMIN — DOXYCYCLINE 100 MG: 100 CAPSULE ORAL at 08:36

## 2021-05-28 RX ADMIN — DULOXETINE HYDROCHLORIDE 30 MG: 30 CAPSULE, DELAYED RELEASE PELLETS ORAL at 08:36

## 2021-05-28 RX ADMIN — ACETAMINOPHEN 650 MG: 325 TABLET, FILM COATED ORAL at 13:42

## 2021-05-28 RX ADMIN — FINGOLIMOD HCL 0.5 MG: 0.5 CAPSULE ORAL at 08:47

## 2021-05-28 RX ADMIN — OXYCODONE HYDROCHLORIDE 5 MG: 5 TABLET ORAL at 21:20

## 2021-05-28 RX ADMIN — BACLOFEN 10 MG: 10 TABLET ORAL at 21:20

## 2021-05-28 RX ADMIN — FUROSEMIDE 20 MG: 20 TABLET ORAL at 08:36

## 2021-05-28 RX ADMIN — BACLOFEN 10 MG: 10 TABLET ORAL at 08:36

## 2021-05-28 RX ADMIN — LEVETIRACETAM 500 MG: 500 TABLET, FILM COATED ORAL at 18:07

## 2021-05-28 RX ADMIN — OXYCODONE HYDROCHLORIDE 5 MG: 5 TABLET ORAL at 08:37

## 2021-05-28 RX ADMIN — OXYCODONE HYDROCHLORIDE 5 MG: 5 TABLET ORAL at 13:41

## 2021-05-28 RX ADMIN — LACTULOSE 30 ML: 10 SOLUTION ORAL at 08:36

## 2021-05-28 RX ADMIN — OXYCODONE HYDROCHLORIDE 5 MG: 5 TABLET ORAL at 18:07

## 2021-05-28 RX ADMIN — GABAPENTIN 1200 MG: 600 TABLET, FILM COATED ORAL at 18:07

## 2021-05-28 NOTE — PLAN OF CARE
Pt A&O. VSS on RA. Up x lift(wheelchair bond). Repo q2hrly. +2 edema in foot. Helm in place(chronic). Placement pending.

## 2021-05-28 NOTE — PROGRESS NOTES
Care Management Follow Up    Length of Stay (days): 0    Expected Discharge Date: 05/29/21     Concerns to be Addressed: discharge planning     Patient plan of care discussed at interdisciplinary rounds: Yes    Anticipated Discharge Disposition: Long Term Care     Anticipated Discharge Services:    Anticipated Discharge DME:      Patient/family educated on Medicare website which has current facility and service quality ratings: yes  Education Provided on the Discharge Plan:    Patient/Family in Agreement with the Plan: yes    Referrals Placed by CM/SW: Post Acute Facilities  Private pay costs discussed: NA    Additional Information:  Writer spoke to patient regarding TCU/LTC placement. Patient and mother stated that in [place], patient was on a county waiver. She said that [facility] had made the decision to decline patient and stated that he would benefit from living in a place with younger population.     Call placed to [facility] who reported that the MA is not showing as active or received at this time as it was sent into the [facility] yesterday afternoon.  According to the [facility] specialist that will need to be active prior to any assessment for waivered services. Writer spoke to patient and his mother and they are in agreement for referrals to be sent to [facility], [facility], [facility] via [facility].       CORWIN Gutierrez

## 2021-05-28 NOTE — PROGRESS NOTES
[facility]    Medicine Progress Note - Hospitalist Service       Date of Admission:  5/27/2021  Assessment & Plan       Rahul Ruffin is a 41 year old male who presented with need for placement     MS  Total cares, was living in care facility in [place] but wants to be placed here. Just got off plane and came to ED for placement. Reports no acute issues. Labs normal.  Mom says patient usually gets care at [facility] and [place].  I cannot access any records from him through epic.  - PTA medications  - Social work following to help with placement      Chronic indwelling payne  UA grossly infected, may be chronic. Afebrile with normal WBC count. Mom states he is currently on abx for UTI     Chronic foot swelling  Present x 8 months.     Chronic wounds  Upper L lateral calf and R heel, states baseline.   - Wound cares     COVID-19 negative on admission       Diet: Mechanical/Dental Soft Diet  Room Service    DVT Prophylaxis: Pneumatic Compression Devices  Payne Catheter: in place, indication: Neurogenic Bladder  Code Status: Full Code           Disposition Plan   Expected discharge: TBD, once disposition planning done  Entered: Raul Samano DO 05/28/2021, 2:38 PM       The patient's care was discussed with the Bedside Nurse, Care Coordinator/, Patient and Patient's Family.    Raul Samano DO  Hospitalist Service  [facility]  Contact information available via [facility] Paging/Directory    ______________________________________________________________________    Interval History   Patient seen and examined.  No acute events over night.  No new complaints.  No fevers.  No difficulty breathing.     Data reviewed today: I reviewed all medications, new labs and imaging results over the last 24 hours. I personally reviewed no images or EKG's today.    Physical Exam   Vital Signs: Temp: 98  F (36.7  C) Temp src: Axillary BP: 126/83 Pulse: 100    Resp: 16 SpO2: 98 % O2 Device: None (Room air)    Weight: 0 lbs 0 oz  General Appearance: Resting comfortably.  NAD   Respiratory: Clear to auscultation.  No respiratory distress  Cardiovascular: RRR  GI: Soft.  Non-distended  Skin: No obvious rashes or cyanosis  Other: No edema     Data   Recent Labs   Lab 05/27/21  0133   WBC 4.7   HGB 16.8   MCV 83         POTASSIUM 3.8   CHLORIDE 102   CO2 31   BUN 11   CR 0.98   ANIONGAP 5   SALINAS 9.5   GLC 66*     No results found for this or any previous visit (from the past 24 hour(s)).

## 2021-05-28 NOTE — PLAN OF CARE
A&O, forgetful at times.  VSS, RA.  CMS intact.  Baseline WC bound.  Multiple wound, see WOC for wound care.  [id number].  Helm patent and intact.  Pain managed with oxy.  Discharge pending.  Ordered pulsate mattress.

## 2021-05-29 PROCEDURE — 250N000013 HC RX MED GY IP 250 OP 250 PS 637: Performed by: INTERNAL MEDICINE

## 2021-05-29 PROCEDURE — G0378 HOSPITAL OBSERVATION PER HR: HCPCS

## 2021-05-29 PROCEDURE — 99225 PR SUBSEQUENT OBSERVATION CARE,LEVEL II: CPT | Performed by: INTERNAL MEDICINE

## 2021-05-29 RX ADMIN — GABAPENTIN 1200 MG: 600 TABLET, FILM COATED ORAL at 09:14

## 2021-05-29 RX ADMIN — LACTULOSE 30 ML: 10 SOLUTION ORAL at 09:14

## 2021-05-29 RX ADMIN — OXYCODONE HYDROCHLORIDE 5 MG: 5 TABLET ORAL at 09:13

## 2021-05-29 RX ADMIN — LEVETIRACETAM 500 MG: 500 TABLET, FILM COATED ORAL at 06:02

## 2021-05-29 RX ADMIN — DULOXETINE HYDROCHLORIDE 30 MG: 30 CAPSULE, DELAYED RELEASE PELLETS ORAL at 09:13

## 2021-05-29 RX ADMIN — ACETAMINOPHEN 650 MG: 325 TABLET, FILM COATED ORAL at 11:07

## 2021-05-29 RX ADMIN — POTASSIUM CHLORIDE 10 MEQ: 750 TABLET, EXTENDED RELEASE ORAL at 09:13

## 2021-05-29 RX ADMIN — FINGOLIMOD HCL 0.5 MG: 0.5 CAPSULE ORAL at 09:15

## 2021-05-29 RX ADMIN — DOXYCYCLINE 100 MG: 100 CAPSULE ORAL at 09:13

## 2021-05-29 RX ADMIN — OXYCODONE HYDROCHLORIDE 5 MG: 5 TABLET ORAL at 21:12

## 2021-05-29 RX ADMIN — GABAPENTIN 1200 MG: 600 TABLET, FILM COATED ORAL at 15:19

## 2021-05-29 RX ADMIN — BACLOFEN 10 MG: 10 TABLET ORAL at 09:13

## 2021-05-29 RX ADMIN — LEVETIRACETAM 500 MG: 500 TABLET, FILM COATED ORAL at 18:49

## 2021-05-29 RX ADMIN — OXYCODONE HYDROCHLORIDE 5 MG: 5 TABLET ORAL at 15:19

## 2021-05-29 RX ADMIN — BACLOFEN 10 MG: 10 TABLET ORAL at 21:11

## 2021-05-29 RX ADMIN — FUROSEMIDE 20 MG: 20 TABLET ORAL at 09:13

## 2021-05-29 NOTE — UTILIZATION REVIEW
"[facility] Utilization Review  Admission Status; Secondary Review Determination     Admission Date: 5/27/2021 12:59 AM      Under the authority of the Utilization Management Committee, the utilization review process indicated a secondary review on the above patient.  The review outcome is based on review of the medical records, discussions with staff, and applying clinical experience noted on the date of the review.        (X) Observation Status Appropriate - This patient does not meet hospital inpatient criteria and is placed in observation status. If this patient's primary payer is Medicare and was admitted as an inpatient, Condition Code 44 should be used and patient status changed to \"observation\".   () Observation Status concurrent Review           RATIONALE FOR DETERMINATION   Rahul Ruffin is a 41 year old male with past medical history of advanced multiple sclerosis, who presented with need for placement.  Traveled from care facility in [place], with request for placement due to inability to care for self.  No acute issues but does have advanced multiple sclerosis and chronic wounds.   are consulted and MA assistance request is currently pending for TCU placement.Based on severity of illness and intensity of service, patient does not meet criteria for inpatient admission.  No acute issues, need for aggressive intervention or work-up.  Mainly logistical issues impeding discharge and currently awaiting TCU placement, therefore criteria for inpatient admission is not met despite more than 2 midnight stay.  Observation cares are appropriate.        The information on this document is developed by the utilization review team in order for the business office to ensure compliance.  This only denotes the appropriateness of proper admission status and does not reflect the quality of care rendered.              Sincerely,       Boogie Manning MD, MS  Physician Advisor  Utilization " [facility]    Phone: [phone]

## 2021-05-29 NOTE — PLAN OF CARE
A&Ox4. VSS on RA. Forgetful at times. CMS's intact. Spasticity and weakness in all four extremities. Nystagmus in eyes. Pain controlled with scheduled mediations and PRN tylenol. Total care. Ax2 turn and repo. Wheelchair bound at baseline. Chronic payne with adequate wes output. However, some yellow meatal discharge and payne cares completed as needed. Tolerating mechanical soft diet and total feed.  Wound care completed on L calf, bilateral heels, bilateral posterior thighs and sacrum. SW/CM following for LTC placement. Discharge pending.

## 2021-05-29 NOTE — PLAN OF CARE
Patient vital signs are at baseline: Yes  Patient able to ambulate as they were prior to admission or with assist devices provided by therapies during their stay:  Yes  Patient MUST void prior to discharge:  Yes  Patient able to tolerate oral intake:  Yes  Pain has adequate pain control using Oral analgesics:  Yes      AOx4, VSS on RA, baseline tremor, payne patent and draining, oxycodone x1 for pain in BLEs, on schduled Keppra, several open wounds - heel, calf, coccyx - WOC following.

## 2021-05-29 NOTE — PROGRESS NOTES
[facility]    Medicine Progress Note - Hospitalist Service       Date of Admission:  5/27/2021  Assessment & Plan       Rahul Ruffin is a 41 year old male who presented with need for placement     MS  Total cares, was living in care facility in [place] but wants to be placed here. Just got off plane and came to ED for placement. Reports no acute issues. Labs normal.  Mom says patient usually gets care at [facility] and [place].  I cannot access any records from him through epic.  - PTA medications  - Social work following to help with placement      Chronic indwelling payne  UA grossly infected, may be chronic. Afebrile with normal WBC count. Mom states he is currently on abx for UTI     Chronic foot swelling  Present x 8 months.   - Tylenol PRN     Chronic wounds  Upper L lateral calf and R heel, states baseline.   - Wound cares     COVID-19 negative on admission         Diet: Mechanical/Dental Soft Diet  Room Service    DVT Prophylaxis: Pneumatic Compression Devices  Payne Catheter: in place, indication: Neurogenic Bladder  Code Status: Full Code           Disposition Plan   Expected discharge: Hopefully in 3-4 days if we are able to get MA pending and find a TCU. Referrals have been sent and we are now awaiting word from the [facility].  Will likely not know until Tuesday at the earliest due to holiday weekend    Entered: Raul Samano DO 05/29/2021, 11:00 AM       The patient's care was discussed with the Bedside Nurse, Care Coordinator/ and Patient.    Raul Samano DO  Hospitalist Service  [facility]  Contact information available via [facility] Paging/Directory    ______________________________________________________________________    Interval History   Patient seen and examined.  No acute events over night.  No fevers or chills.  Still having some foot pain that he states is chronic and requesting tylenol.  No difficulty breathing.   No new neurologic symptoms    Data reviewed today: I reviewed all medications, new labs and imaging results over the last 24 hours. I personally reviewed no images or EKG's today.    Physical Exam   Vital Signs: Temp: 98.7  F (37.1  C) Temp src: Oral BP: 136/88 Pulse: 74   Resp: 16 SpO2: 97 % O2 Device: None (Room air)    Weight: 0 lbs 0 oz  General Appearance: Resting comfortably. NAD   Respiratory: Clear to auscultation.  No respiratory distress  Cardiovascular: RRR.  No obvious murmurs  GI: Soft.  Non-distended  Skin: No obvious rashes or cyanosis  Other: Alert.  No edema      Data   Recent Labs   Lab 05/27/21  0133   WBC 4.7   HGB 16.8   MCV 83         POTASSIUM 3.8   CHLORIDE 102   CO2 31   BUN 11   CR 0.98   ANIONGAP 5   SALINAS 9.5   GLC 66*     No results found for this or any previous visit (from the past 24 hour(s)).

## 2021-05-30 PROCEDURE — G0378 HOSPITAL OBSERVATION PER HR: HCPCS

## 2021-05-30 PROCEDURE — 250N000013 HC RX MED GY IP 250 OP 250 PS 637: Performed by: INTERNAL MEDICINE

## 2021-05-30 PROCEDURE — 99225 PR SUBSEQUENT OBSERVATION CARE,LEVEL II: CPT | Performed by: INTERNAL MEDICINE

## 2021-05-30 RX ADMIN — GABAPENTIN 1200 MG: 600 TABLET, FILM COATED ORAL at 09:34

## 2021-05-30 RX ADMIN — GABAPENTIN 1200 MG: 600 TABLET, FILM COATED ORAL at 02:32

## 2021-05-30 RX ADMIN — DULOXETINE HYDROCHLORIDE 30 MG: 30 CAPSULE, DELAYED RELEASE PELLETS ORAL at 09:34

## 2021-05-30 RX ADMIN — DOXYCYCLINE 100 MG: 100 CAPSULE ORAL at 09:34

## 2021-05-30 RX ADMIN — BACLOFEN 10 MG: 10 TABLET ORAL at 09:34

## 2021-05-30 RX ADMIN — LEVETIRACETAM 500 MG: 500 TABLET, FILM COATED ORAL at 18:18

## 2021-05-30 RX ADMIN — LEVETIRACETAM 500 MG: 500 TABLET, FILM COATED ORAL at 06:28

## 2021-05-30 RX ADMIN — OXYCODONE HYDROCHLORIDE 5 MG: 5 TABLET ORAL at 09:34

## 2021-05-30 RX ADMIN — OXYCODONE HYDROCHLORIDE 5 MG: 5 TABLET ORAL at 21:11

## 2021-05-30 RX ADMIN — FINGOLIMOD HCL 0.5 MG: 0.5 CAPSULE ORAL at 11:11

## 2021-05-30 RX ADMIN — POTASSIUM CHLORIDE 10 MEQ: 750 TABLET, EXTENDED RELEASE ORAL at 09:34

## 2021-05-30 RX ADMIN — OXYCODONE HYDROCHLORIDE 5 MG: 5 TABLET ORAL at 16:35

## 2021-05-30 RX ADMIN — FUROSEMIDE 20 MG: 20 TABLET ORAL at 09:35

## 2021-05-30 RX ADMIN — SENNOSIDES AND DOCUSATE SODIUM 1 TABLET: 8.6; 5 TABLET ORAL at 16:35

## 2021-05-30 RX ADMIN — LACTULOSE 30 ML: 10 SOLUTION ORAL at 09:35

## 2021-05-30 RX ADMIN — GABAPENTIN 1200 MG: 600 TABLET, FILM COATED ORAL at 23:13

## 2021-05-30 RX ADMIN — GABAPENTIN 1200 MG: 600 TABLET, FILM COATED ORAL at 16:35

## 2021-05-30 RX ADMIN — BACLOFEN 10 MG: 10 TABLET ORAL at 21:12

## 2021-05-30 NOTE — PROGRESS NOTES
[facility]    Medicine Progress Note - Hospitalist Service       Date of Admission:  5/27/2021  Assessment & Plan       Rhaul Ruffin is a 41 year old male who presented with need for placement     MS  Total cares, was living in care facility in [place] but wants to be placed here. Just got off plane and came to ED for placement. Reports no acute issues. Labs normal.  Mom says patient usually gets care at [facility] and [place].  I cannot access any records from him through epic.  - PTA medications  - Social work following to help with placement.  Awaiting to hear on MA status      Chronic indwelling payne  UA grossly infected, may be chronic. Afebrile with normal WBC count. Mom states he is currently on abx for UTI     Chronic foot swelling  Present x 8 months.   - Tylenol PRN     Chronic wounds  Upper L lateral calf and R heel, states baseline.   - Wound cares     COVID-19 negative on admission           Diet: Mechanical/Dental Soft Diet  Room Service    DVT Prophylaxis: Pneumatic Compression Devices  Payne Catheter: in place, indication: Neurogenic Bladder(chronic)  Code Status: Full Code           Disposition Plan   Expected discharge: TBD, hopefully early in the week once we find a place for the patient to go   Entered: Raul Samano DO 05/30/2021, 10:32 AM       The patient's care was discussed with the Bedside Nurse, Care Coordinator/ and Patient.    Raul Samano DO  Hospitalist Service  [facility]  Contact information available via [facility] Paging/Directory    ______________________________________________________________________    Interval History   Patient seen and examined.  No acute events over night.  Still having some foot pain but the tylenol appears to be working.  No fevers noted.  No difficulty breathing.  Tolerating diet.    Data reviewed today: I reviewed all medications, new labs and imaging results over the last  24 hours. I personally reviewed no images or EKG's today.    Physical Exam   Vital Signs: Temp: 97.2  F (36.2  C) Temp src: Oral BP: 127/81 Pulse: 81   Resp: 16 SpO2: 97 % O2 Device: None (Room air)    Weight: 0 lbs 0 oz  General Appearance: Resting comfortably. NAD   Respiratory: Clear to auscultation.  No respiratory distress  Cardiovascular: RRR  GI: Soft.  Non-distended  Skin: No obvious rashes or cyanosis  Other: Chronic edema noted to lower extremities.  Alert      Data   Recent Labs   Lab 05/27/21  0133   WBC 4.7   HGB 16.8   MCV 83         POTASSIUM 3.8   CHLORIDE 102   CO2 31   BUN 11   CR 0.98   ANIONGAP 5   SALINAS 9.5   GLC 66*     No results found for this or any previous visit (from the past 24 hour(s)).

## 2021-05-30 NOTE — PLAN OF CARE
A&Ox 4. Assist X2 and lift to wheelchair (baseline). VSS on RA. Dressing(s) C/D/I. Pain controlled with Yanira, Tylenol. Tolerating Mech soft (total feed) Diet. Progressing per POC. Will Cont to monitor.

## 2021-05-31 PROCEDURE — 99207 PR CDG-CODE CATEGORY CHANGED: CPT | Performed by: INTERNAL MEDICINE

## 2021-05-31 PROCEDURE — 250N000013 HC RX MED GY IP 250 OP 250 PS 637: Performed by: INTERNAL MEDICINE

## 2021-05-31 PROCEDURE — 99225 PR SUBSEQUENT OBSERVATION CARE,LEVEL II: CPT | Performed by: INTERNAL MEDICINE

## 2021-05-31 PROCEDURE — G0378 HOSPITAL OBSERVATION PER HR: HCPCS

## 2021-05-31 RX ADMIN — FINGOLIMOD HCL 0.5 MG: 0.5 CAPSULE ORAL at 09:03

## 2021-05-31 RX ADMIN — GABAPENTIN 1200 MG: 600 TABLET, FILM COATED ORAL at 15:59

## 2021-05-31 RX ADMIN — LEVETIRACETAM 500 MG: 500 TABLET, FILM COATED ORAL at 04:50

## 2021-05-31 RX ADMIN — BACLOFEN 10 MG: 10 TABLET ORAL at 21:27

## 2021-05-31 RX ADMIN — GABAPENTIN 1200 MG: 600 TABLET, FILM COATED ORAL at 08:18

## 2021-05-31 RX ADMIN — POTASSIUM CHLORIDE 10 MEQ: 750 TABLET, EXTENDED RELEASE ORAL at 08:18

## 2021-05-31 RX ADMIN — LACTULOSE 30 ML: 10 SOLUTION ORAL at 08:22

## 2021-05-31 RX ADMIN — FUROSEMIDE 20 MG: 20 TABLET ORAL at 08:18

## 2021-05-31 RX ADMIN — DOXYCYCLINE 100 MG: 100 CAPSULE ORAL at 08:18

## 2021-05-31 RX ADMIN — DULOXETINE HYDROCHLORIDE 30 MG: 30 CAPSULE, DELAYED RELEASE PELLETS ORAL at 08:18

## 2021-05-31 RX ADMIN — BACLOFEN 10 MG: 10 TABLET ORAL at 08:18

## 2021-05-31 RX ADMIN — LEVETIRACETAM 500 MG: 500 TABLET, FILM COATED ORAL at 18:03

## 2021-05-31 RX ADMIN — OXYCODONE HYDROCHLORIDE 5 MG: 5 TABLET ORAL at 15:59

## 2021-05-31 RX ADMIN — OXYCODONE HYDROCHLORIDE 5 MG: 5 TABLET ORAL at 21:27

## 2021-05-31 RX ADMIN — OXYCODONE HYDROCHLORIDE 5 MG: 5 TABLET ORAL at 08:18

## 2021-05-31 NOTE — PROGRESS NOTES
Care Management Follow Up    Length of Stay (days): 0    Expected Discharge Date: 06/01/21     Concerns to be Addressed: discharge planning     Patient plan of care discussed at interdisciplinary rounds: Yes    Anticipated Discharge Disposition: Long Term Care     Anticipated Discharge Services:  LTC  Anticipated Discharge DME:  N/A    Patient/family educated on Medicare website which has current facility and service quality ratings: yes  Education Provided on the Discharge Plan:  N/A  Patient/Family in Agreement with the Plan: yes    Referrals Placed by CM/SW: Post Acute Facilities  Private pay costs discussed: Not applicable    Additional Information:  Received a call back from [facility].  They have no available beds.  Received messages back on discharge on the double stating that [facility] [place] have no available beds.    Will continue to follow.      NILESH Sparks, [facility]    [phone]  [facility]

## 2021-05-31 NOTE — PLAN OF CARE
A/Ox4, VSS on RA. Good appetite. W/C bound at baseline. Total assist with cares d/t MS. Denies pain when asked, has scheduled oxycodone and Gabapentin. Has chronic payne catheter for neurogenic bladder. Waiting for placement. Repositioned frequently and made comfortable.

## 2021-05-31 NOTE — PROGRESS NOTES
"[facility]    Medicine Progress Note - Hospitalist Service       Date of Admission:  5/27/2021  Assessment & Plan       Rahul Ruffin is a 41 year old male who presented with need for placement     MS  Total cares, was living in care facility in [place] but wants to be placed here. Just got off plane and came to ED for placement. Reports no acute issues. Labs normal.  Mom says patient usually gets care at [facility] and [place].  I cannot access any records from him through epic.  - PTA medications  - Social work following to help with placement.  Awaiting to hear on MA status      Chronic indwelling payne  UA grossly infected, may be chronic. Afebrile with normal WBC count. Mom states he is currently on abx for UTI     Chronic foot swelling  Present x 8 months.   - Tylenol PRN     Chronic wounds  Upper L lateral calf and R heel, states baseline.   - Wound cares     COVID-19 negative on admission       Diet: Mechanical/Dental Soft Diet  Room Service    DVT Prophylaxis: Pneumatic Compression Devices  Payne Catheter: in place, indication: Neurogenic Bladder  Code Status: Full Code           Disposition Plan   Expected discharge: TBD, hopefully early in the week once we find a place for the patient to go   Entered: Sunni Avelar MD 05/31/2021, 9:12 AM       The patient's care was discussed with the Bedside Nurse, Care Coordinator/ and Patient.    uSnni Avelar MD  Hospitalist Service  [facility]  Contact information available via [facility] Paging/Directory    ______________________________________________________________________    Interval History   \"I have some pain by my left elbow.\"  Patient complains of discomfort near peripheral IV in his left antecubital fossa.  He has no new respiratory or GI complaints.    Data reviewed today: I reviewed all medications, new labs and imaging results over the last 24 hours. I personally reviewed no " images or EKG's today.    Physical Exam   Vital Signs: Temp: 98.2  F (36.8  C) Temp src: Oral BP: 115/82 Pulse: 74   Resp: 16 SpO2: 97 % O2 Device: None (Room air)    Weight: 0 lbs 0 oz  General Appearance: Awake, alert  Respiratory: Clear to auscultation.  No respiratory distress  Cardiovascular: RRR  GI: Soft.  Non-distended  Skin: No rash on exposed skin.  There is no redness or warmth near peripheral IV in the left antecubital fossa  Other: Has dressings on both lower extremities  Psych: Mood is very pleasant    Data   Recent Labs   Lab 05/27/21  0133   WBC 4.7   HGB 16.8   MCV 83         POTASSIUM 3.8   CHLORIDE 102   CO2 31   BUN 11   CR 0.98   ANIONGAP 5   SALINAS 9.5   GLC 66*     No results found for this or any previous visit (from the past 24 hour(s)).

## 2021-05-31 NOTE — PLAN OF CARE
A&Ox 4. Assist X2 and lift to wheelchair (baseline). Turn & repo. VSS on RA. Dressing(s) C/D/I. Pain controlled with Yanira & Tylenol. Tolerating Mech soft (total feed) Diet. Gave a mini bed bath overnight. Progressing per POC. Will Cont to monitor.

## 2021-05-31 NOTE — PLAN OF CARE
A&O, forgetful at times.  VSS, RA.  CMS intact.  Baseline WC bound, Repo q2hrs.  Helm patent and intact. Multiple wound on buttocks, thigh, calf and heels, WOC following.  [facility], needs help feeding.  Discharge pending.    102

## 2021-06-01 PROCEDURE — 99224 PR SUBSEQUENT OBSERVATION CARE,LEVEL I: CPT | Performed by: INTERNAL MEDICINE

## 2021-06-01 PROCEDURE — 250N000013 HC RX MED GY IP 250 OP 250 PS 637: Performed by: INTERNAL MEDICINE

## 2021-06-01 PROCEDURE — G0378 HOSPITAL OBSERVATION PER HR: HCPCS

## 2021-06-01 PROCEDURE — 99207 PR CDG-CODE CATEGORY CHANGED: CPT | Performed by: INTERNAL MEDICINE

## 2021-06-01 RX ADMIN — LEVETIRACETAM 500 MG: 500 TABLET, FILM COATED ORAL at 19:01

## 2021-06-01 RX ADMIN — GABAPENTIN 1200 MG: 600 TABLET, FILM COATED ORAL at 16:03

## 2021-06-01 RX ADMIN — FUROSEMIDE 20 MG: 20 TABLET ORAL at 09:19

## 2021-06-01 RX ADMIN — ACETAMINOPHEN 650 MG: 325 TABLET, FILM COATED ORAL at 20:20

## 2021-06-01 RX ADMIN — DULOXETINE HYDROCHLORIDE 30 MG: 30 CAPSULE, DELAYED RELEASE PELLETS ORAL at 09:19

## 2021-06-01 RX ADMIN — BACLOFEN 10 MG: 10 TABLET ORAL at 20:20

## 2021-06-01 RX ADMIN — BACLOFEN 10 MG: 10 TABLET ORAL at 09:19

## 2021-06-01 RX ADMIN — OXYCODONE HYDROCHLORIDE 5 MG: 5 TABLET ORAL at 09:19

## 2021-06-01 RX ADMIN — OXYCODONE HYDROCHLORIDE 5 MG: 5 TABLET ORAL at 22:12

## 2021-06-01 RX ADMIN — POTASSIUM CHLORIDE 10 MEQ: 750 TABLET, EXTENDED RELEASE ORAL at 09:19

## 2021-06-01 RX ADMIN — ACETAMINOPHEN 650 MG: 325 TABLET, FILM COATED ORAL at 06:13

## 2021-06-01 RX ADMIN — GABAPENTIN 1200 MG: 600 TABLET, FILM COATED ORAL at 07:57

## 2021-06-01 RX ADMIN — GABAPENTIN 1200 MG: 600 TABLET, FILM COATED ORAL at 00:28

## 2021-06-01 RX ADMIN — OXYCODONE HYDROCHLORIDE 5 MG: 5 TABLET ORAL at 01:29

## 2021-06-01 RX ADMIN — LACTULOSE 30 ML: 10 SOLUTION ORAL at 09:19

## 2021-06-01 RX ADMIN — LEVETIRACETAM 500 MG: 500 TABLET, FILM COATED ORAL at 06:03

## 2021-06-01 RX ADMIN — DOXYCYCLINE 100 MG: 100 CAPSULE ORAL at 09:19

## 2021-06-01 RX ADMIN — OXYCODONE HYDROCHLORIDE 5 MG: 5 TABLET ORAL at 16:03

## 2021-06-01 RX ADMIN — FINGOLIMOD HCL 0.5 MG: 0.5 CAPSULE ORAL at 09:19

## 2021-06-01 NOTE — PLAN OF CARE
A&Ox4. VSS on RA. Pain controlled with PRN Tylenol and scheduled oxycodone. Total care. Turn and repo. Chronic payne in place and patent. No IV. Pre-existing wounds on Bilateral posterior thighs, L calf, and Right heel. Wound care completed this shift. Blanchable redness on Sacrum and Lateral R ankles. Sacral mepilex changed and floating heels. Discharge pending placement. SW/CM following.

## 2021-06-01 NOTE — PROGRESS NOTES
Care Management Follow Up    Length of Stay (days): 0    Expected Discharge Date: 06/02/21     Concerns to be Addressed: discharge planning     Patient plan of care discussed at interdisciplinary rounds: Yes    Anticipated Discharge Disposition: Long Term Care     Anticipated Discharge Services:    Anticipated Discharge DME:      Patient/family educated on Medicare website which has current facility and service quality ratings: yes  Education Provided on the Discharge Plan:    Patient/Family in Agreement with the Plan: yes    Referrals Placed by CM/SW: Post Acute Facilities  Private pay costs discussed: Not applicable    Additional Information:  Call received from [facility] regarding MA tirso. Writer asked for a copy of the application to be faxed for review. Call placed to [facility] in  requesting a copy of the application via fax. Application received and faxed to [facility]. They will review and be in touch. Copy of tirso on patient's chart.      CORWIN Gutierrez

## 2021-06-01 NOTE — PLAN OF CARE
Pt A&Ox4, turned and repo Q2hrs, VSS on RA, CMS intact, dressings CDI. Pain controlled with Oxycodone and Yanira. Mechanical dental soft diet. Helm in place. Multiple wounds, WOC following. Discharge pending placement. Continue to monitor.

## 2021-06-01 NOTE — PLAN OF CARE
Summary: MS- Placement   Date/Time 6/1/21 0476-5604    Service: Medical  Behavior/Aggression tool color: Green  Diagnosis: MS   POD#: na  Mental Status: A&0x3 - 4  Activity/dangle: up w lift. WC bound at baseline  Diet: mech soft- Total Feed  Pain: oxy PRN  Payne/Voiding: chronic payne  Tele/Restraints/Iso: na  02/LDA: RA  D/C Date: TBD  Other Info:     Patient A&0x4. He is wheel chair bound at baseline. Up with lift. Total care. Turn q 2 hrs. VSS on RA. Dressings changed on day shift. C/o intermittent pain in his feet. Full code. Has chronic indwelling payne. Awaiting NH placement.

## 2021-06-01 NOTE — PLAN OF CARE
A/Ox4, VSS on RA. W/C bound at baseline, total assist with cares C/o right leg/foot pain scheduled oxycodone and Gabapentin given w/ relief. Chronic payne catheter is patent and draining well. Frequent T/R done and made comfortable.

## 2021-06-01 NOTE — PROGRESS NOTES
"[facility]    Medicine Progress Note - Hospitalist Service       Date of Admission:  5/27/2021  Assessment & Plan       Rahul Ruffin is a 41 year old male who presented with need for placement     MS  Total cares, was living in care facility in [place] but wants to be placed here. Just got off plane and came to ED for placement. Reports no acute issues. Labs normal.  Mom says patient usually gets care at [facility] and [place].  I cannot access any records from him through epic.  - PTA medications  - Social work following to help with placement.  Awaiting to hear on MA status      Chronic indwelling payne  UA grossly infected, may be chronic. Afebrile with normal WBC count. Mom states he is currently on abx for UTI     Chronic foot swelling  Present x 8 months.   - Tylenol PRN     Chronic wounds  Upper L lateral calf and R heel, states baseline.   - Wound cares     COVID-19 negative on admission       Diet: Mechanical/Dental Soft Diet  Room Service    DVT Prophylaxis: Pneumatic Compression Devices  Payne Catheter: in place, indication: Other (Comment)(chronic)  Code Status: Full Code           Disposition Plan   Expected discharge: TBD, hopefully early in the week once we find a place for the patient to go   Entered: Sunni Avelar MD 06/01/2021, 8:51 AM       The patient's care was discussed with the Bedside Nurse, Care Coordinator/ and Patient.    Sunni Avelar MD  Hospitalist Service  [facility]  Contact information available via [facility] Paging/Directory    ______________________________________________________________________    Interval History   \"Hey, sweetie.\"  Patient is resting, wakes to voice.  He denies pain near left elbow, where peripheral IV was in place.  He has no new respiratory or GI complaints.    Data reviewed today: I reviewed all medications, new labs and imaging results over the last 24 hours. I personally reviewed no " images or EKG's today.    Physical Exam   Vital Signs: Temp: 98.2  F (36.8  C) Temp src: Oral BP: 118/76 Pulse: 80   Resp: 16 SpO2: 96 % O2 Device: None (Room air)    Weight: 0 lbs 0 oz  General Appearance: Dozing, wakes to voice  Respiratory: Clear to auscultation.  No respiratory distress  Cardiovascular: RRR  GI: Soft.  Non-distended  Skin: No rash on exposed skin.  There is no redness or warmth in the left antecubital fossa  Other: Has dressings on both lower extremities  Psych: Mood is very pleasant    Data   Recent Labs   Lab 05/27/21  0133   WBC 4.7   HGB 16.8   MCV 83         POTASSIUM 3.8   CHLORIDE 102   CO2 31   BUN 11   CR 0.98   ANIONGAP 5   SALINAS 9.5   GLC 66*     No results found for this or any previous visit (from the past 24 hour(s)).

## 2021-06-02 LAB
ALBUMIN SERPL-MCNC: 3.1 G/DL (ref 3.4–5)
ALP SERPL-CCNC: 86 U/L (ref 40–150)
ALT SERPL W P-5'-P-CCNC: 24 U/L (ref 0–70)
AST SERPL W P-5'-P-CCNC: 21 U/L (ref 0–45)
BILIRUB DIRECT SERPL-MCNC: 0.2 MG/DL (ref 0–0.2)
BILIRUB SERPL-MCNC: 0.6 MG/DL (ref 0.2–1.3)
PROT SERPL-MCNC: 7.5 G/DL (ref 6.8–8.8)

## 2021-06-02 PROCEDURE — G0378 HOSPITAL OBSERVATION PER HR: HCPCS

## 2021-06-02 PROCEDURE — 36415 COLL VENOUS BLD VENIPUNCTURE: CPT | Performed by: INTERNAL MEDICINE

## 2021-06-02 PROCEDURE — 250N000013 HC RX MED GY IP 250 OP 250 PS 637: Performed by: INTERNAL MEDICINE

## 2021-06-02 PROCEDURE — G0463 HOSPITAL OUTPT CLINIC VISIT: HCPCS

## 2021-06-02 PROCEDURE — 99225 PR SUBSEQUENT OBSERVATION CARE,LEVEL II: CPT | Performed by: INTERNAL MEDICINE

## 2021-06-02 PROCEDURE — 99207 PR CDG-CODE CATEGORY CHANGED: CPT | Performed by: INTERNAL MEDICINE

## 2021-06-02 PROCEDURE — 80076 HEPATIC FUNCTION PANEL: CPT | Performed by: INTERNAL MEDICINE

## 2021-06-02 RX ORDER — GABAPENTIN 300 MG/1
900 CAPSULE ORAL 4 TIMES DAILY
Status: DISCONTINUED | OUTPATIENT
Start: 2021-06-02 | End: 2021-06-04

## 2021-06-02 RX ORDER — GABAPENTIN 800 MG/1
800 TABLET ORAL 4 TIMES DAILY
Status: DISCONTINUED | OUTPATIENT
Start: 2021-06-02 | End: 2021-06-02

## 2021-06-02 RX ADMIN — LEVETIRACETAM 500 MG: 500 TABLET, FILM COATED ORAL at 05:32

## 2021-06-02 RX ADMIN — BACLOFEN 10 MG: 10 TABLET ORAL at 21:29

## 2021-06-02 RX ADMIN — OXYCODONE HYDROCHLORIDE 5 MG: 5 TABLET ORAL at 15:39

## 2021-06-02 RX ADMIN — BACLOFEN 10 MG: 10 TABLET ORAL at 08:00

## 2021-06-02 RX ADMIN — LEVETIRACETAM 500 MG: 500 TABLET, FILM COATED ORAL at 17:53

## 2021-06-02 RX ADMIN — OXYCODONE HYDROCHLORIDE 5 MG: 5 TABLET ORAL at 12:07

## 2021-06-02 RX ADMIN — GABAPENTIN 900 MG: 300 CAPSULE ORAL at 17:53

## 2021-06-02 RX ADMIN — GABAPENTIN 900 MG: 300 CAPSULE ORAL at 14:13

## 2021-06-02 RX ADMIN — GABAPENTIN 1200 MG: 600 TABLET, FILM COATED ORAL at 07:58

## 2021-06-02 RX ADMIN — OXYCODONE HYDROCHLORIDE 5 MG: 5 TABLET ORAL at 21:29

## 2021-06-02 RX ADMIN — FINGOLIMOD HCL 0.5 MG: 0.5 CAPSULE ORAL at 08:23

## 2021-06-02 RX ADMIN — OXYCODONE HYDROCHLORIDE 5 MG: 5 TABLET ORAL at 08:00

## 2021-06-02 RX ADMIN — FUROSEMIDE 20 MG: 20 TABLET ORAL at 08:00

## 2021-06-02 RX ADMIN — LACTULOSE 30 ML: 10 SOLUTION ORAL at 08:01

## 2021-06-02 RX ADMIN — DOXYCYCLINE 100 MG: 100 CAPSULE ORAL at 08:00

## 2021-06-02 RX ADMIN — GABAPENTIN 1200 MG: 600 TABLET, FILM COATED ORAL at 00:21

## 2021-06-02 RX ADMIN — ACETAMINOPHEN 650 MG: 325 TABLET, FILM COATED ORAL at 12:06

## 2021-06-02 RX ADMIN — GABAPENTIN 900 MG: 300 CAPSULE ORAL at 21:29

## 2021-06-02 RX ADMIN — DULOXETINE HYDROCHLORIDE 30 MG: 30 CAPSULE, DELAYED RELEASE PELLETS ORAL at 08:01

## 2021-06-02 RX ADMIN — POTASSIUM CHLORIDE 10 MEQ: 750 TABLET, EXTENDED RELEASE ORAL at 08:01

## 2021-06-02 RX ADMIN — ACETAMINOPHEN 650 MG: 325 TABLET, FILM COATED ORAL at 05:31

## 2021-06-02 NOTE — PROGRESS NOTES
"[facility]    Medicine Progress Note - Hospitalist Service       Date of Admission:  5/27/2021  Assessment & Plan       Rahul Ruffin is a 41 year old male who presented with need for placement      Principal problem:  Multiple sclerosis  Total cares, was living in care facility in [place] but wants to be placed here. Just got off plane and came to ED for placement. Reports no acute issues. Labs normal.  Mom says patient usually gets care at [facility] and [place].  I cannot access any records from him through epic.    PTA medications, including Fingolimod    Per [employer], \"[employer] has issued a letter to inform health care providers that post-marketing cases of clinically significant liver injury, including markedly elevated serum hepatic enzymes and elevated total bilirubin, have been reported in multiple sclerosis patients treated with Gilenya (fingolimod). Health care providers are advised to:   perform liver function tests, including liver transaminases and bilirubin, before starting treatment with Gilenya; at months 1, 3, 6, 9, and 12 during the first year of treatment; and at regular intervals thereafter until 2 months after Gilenya discontinuation.    monitor patients for signs and symptoms of liver injury, such as unexplained vomiting, abdominal pain, fatigue, anorexia, or jaundice and/or dark urine, during treatment with Gilenya    Check liver panel    Social work following to help with placement.  Awaiting to hear on MA status      Chronic indwelling payne    UA with heavy pyuria, may be chronic. No culture done.    Patient is afebrile with normal WBC count.     Continue Doxycycline (Mom states he is currently on abx for UTI)     Chronic foot swelling  Neuropathic pain  Present x 8 months.     Tylenol PRN    He is already on maximal dose of gabapentin given 3 times daily.  Will divide the dose to 4 times daily dosing, see if he has more consistent pain " "relief    Chronic wounds  Upper L lateral calf and R heel, states baseline.     Wound cares     COVID-19 negative on admission       Diet: Mechanical/Dental Soft Diet  Room Service    DVT Prophylaxis: Pneumatic Compression Devices  Helm Catheter: in place, indication: (MS)  Code Status: Full Code           Disposition Plan   Expected discharge: TBD, patient is medically ready for discharge when MA pending or approved and facility is available  Entered: Sunni Avelar MD 06/02/2021, 8:04 AM     The patient's care was discussed with the Bedside Nurse and Patient.    Sunni Avelar MD  Hospitalist Service  [facility]  Contact information available via [facility] Paging/Directory    ______________________________________________________________________    Interval History   \"I am in agony with this pain in my feet.\"  Patient says he has intermittent pain in both feet related to multiple sclerosis, \"they are numb and they also hurt.\"  He says pain comes and goes, there is no specific time of day when it is always the worst.  He has no new respiratory or GI complaints.    Data reviewed today: I reviewed all medications, new labs and imaging results over the last 24 hours. I personally reviewed no images or EKG's today.    Physical Exam   Vital Signs: Temp: 98.2  F (36.8  C) Temp src: Oral BP: 110/72 Pulse: 71   Resp: 16 SpO2: 99 % O2 Device: None (Room air)    Weight: 0 lbs 0 oz  General Appearance: Awake, alert  Respiratory: Clear to auscultation.  No respiratory distress  Cardiovascular: RRR  GI: Soft.  Non-distended  Skin: No rash on exposed skin.  There is no redness or warmth in the left antecubital fossa  Other: Has dressings on both lower extremities  Psych: Mood is very pleasant    Data   Recent Labs   Lab 05/27/21  0133   WBC 4.7   HGB 16.8   MCV 83         POTASSIUM 3.8   CHLORIDE 102   CO2 31   BUN 11   CR 0.98   ANIONGAP 5   SALINAS 9.5   GLC 66*     No results found for this " or any previous visit (from the past 24 hour(s)).

## 2021-06-02 NOTE — PLAN OF CARE
Summary: MS- Placement   Date/Time 6/2/21 [id number]    Service: Medical  Behavior/Aggression tool color: Green  Diagnosis: MS   POD#: na  Mental Status: A&0x3 - 4  Activity/dangle: up w lift. WC bound at baseline  Diet: mech soft- Total Feed  Pain: Scheduled oxy  Payne/Voiding: Chronic payne  Tele/Restraints/Iso: na  02/LDA: RA  D/C Date: TBD  Other Info:     Patient A&0x4. wheel chair bound at baseline. Up with lift. Total care. Turn q 2 hrs. VSS on RA. Dressings changed on day shift by WOC. New orders placed see WOC orders. C/o intermittent pain in his feet. Full code. Has chronic indwelling payne. Awaiting NH placement. Up to Wheelchair today

## 2021-06-02 NOTE — PROGRESS NOTES
Alert, oriented to self and situation. Thinks he is in [place]. Verbalized intermittent pain to feet. PRN Tylenol given x 2.  Off loading boots on at all times,turned and repo q 2h.  Dressings to wounds intact. Helm patent, draining well. Awaiting LTC placement.

## 2021-06-02 NOTE — PROGRESS NOTES
[facility] Nurse Inpatient Wound Assessment     Reason for consultation: Evaluate and treat coccyx, posterior thighs, heels, left calf, penile meatus      Assessment  Coccyx: basically intact, with scattered superficial friction/shear injuries that are mostly resurfaced.  There is likely pressure component as well, but no obvious pressure injuries present at this time.  Pt now on low air loss mattress and skin overall improving.  Will stop dressings and focus on moisturizing and conditioning the dry skin this week.   Posterior upper thighs: superficial shearing injuries likely from briefs, L>R, nearly healed  Right heel: Stage 2 vs healing Stage 3 CAPI; shallow and clean  Left heel: intact, newly resurfaced tissue, evidence of prior pressure injury  Left lateral calf: old trauma injury that reopens per pt; scarred with minimal open tissue at present  Penile meatus: mild enlargement of meatal opening from chronic catheter; no wounds present, scant drainage this week    Pt has MS and is wheel-chair bound.  At risk for further skin breakdown.  Pulsate mattress and Prevalon boots in place.     Treatment Plan  Coccyx/sacrum, buttocks, thighs: BID and prn:   1.  Cleanse with John perineal lotion and soft dry wipes  2.  Apply Sween 24 cream to all areas, except for any deep/moist skin folds  3.  Leave open to air   4.  If using briefs, ensure are loose/open under pt, and not digging into thighs  5.  PIP measures    Pressure Injury Prevention (PIP) Plan:  -If patient is declining pressure injury prevention interventions: Explore reason why and address patient's concerns and Educate on pressure injury risk and prevention intervention(s)  -Mattress: Pulsate low air loss  -HOB: Maintain at or below 30 degrees, unless contraindicated  -Repositioning in bed: Every 1-2 hours , Left/right positioning; avoid supine and Raise foot of bed prior to raising head of bed, to reduce patient sliding down  (shear)  -Heels: Keep elevated off mattress, Pillows under calves and Heel lift boots  -Protective Dressing: None - unless skin deteriorates, then can resume Mepilex  -Chair positioning: Assist patient to reposition hourly; use his own specialty wheelchair/cushion  -Moisture Management: Perineal cleansing /protection: Follow Incontinence Protocol, Clean and dry skin folds with bathing  and Moisturize dry skin  -Under Devices: Inspect skin under all medical devices during skin inspection , Ensure tubes are stabilized without tension and Ensure patient is not lying on medical devices or equipment when repositioned    Left lateral calf: every 3 days and prn:   1.  Cleanse with MicroKlenz  2.  Apply Optifoam Gentle Border foam dressing    Right heel: every other day and prn:  1.  Cleanse with MicroKlenz  2.  Swab periwound with skin prep, let dry  3.  Apply small piece of Aquacel Ag to open area  4.  Cover with Mepilex 4x4    Left heel:  Daily: Cleanse with bath wipes, then apply Sween 24 cream.  Apply Prevalon boots to bilateral heels when in bed.     Penile meatus: no wound cares needed; continue meticulous catheter and perineal cares and ensure catheter is secured appropriately, not pulling on meatus    Orders Updated  Recommended provider order: None, at this time  WOC Nurse follow-up plan: weekly  Nursing to notify the Provider(s) and re-consult the WOC Nurse if wound(s) deteriorates or new skin concern.    Patient History  According to provider note(s):  Rahul Ruffin is a 41 year old male who presents with need for placement     MS  Total cares, was living in care facility in [place] but wants to be placed here. Just got off plane and came to ED for placement. Reports no acute issues. Labs normal.  Mom says patient usually gets care at [facility] and [place].  I cannot access any records from him through epic.    Objective Data  Containment of urine/stool: Incontinence Protocol, Brief and Indwelling  catheter    Active Diet Order:  Orders Placed This Encounter      Mechanical/Dental Soft Diet    Output:   I/O last 3 completed shifts:  In: 360 [P.O.:360]  Out: 1000 [Urine:1000]    Risk Assessment:   Sensory Perception: 3-->slightly limited  Moisture: 3-->occasionally moist  Activity: 2-->chairfast  Mobility: 2-->very limited  Nutrition: 3-->adequate  Friction and Shear: 1-->problem  Nick Score: 14                          Labs:   Recent Labs   Lab 06/02/21  0846 05/27/21  0133   ALBUMIN 3.1*  --    HGB  --  16.8   WBC  --  4.7       Physical Exam  Skin inspection: focused coccyx, posterior thighs, lower legs, heels, penis    Wound Location:  Coccyx   Date of last photo:  6-2-21      Wound History: per report, pt with poor hygiene on admission.  Pt bed/chair bound, can help with turns, but still needs assist x 1-2.  Chronic payne in place but incontinent of formed soft stool.   Measurements (length x width x depth, in cm): scattered pink areas 0.5cm or less, no depth  Wound Base: pink fragile newly resurfaced tissue; dry flaky peely skin  Palpation of the wound bed: normal   Periwound skin: intact, darker pigmentation to buttocks, dry and flaky  Color: normal to darker brown  Temperature: normal   Drainage: none  Odor: none   Pain: denies      Wound Location:  Posterior thighs  Date of last photo:  6-2-21      Wound History: present on admission, likely from brief  Measurements (length x width x depth, in cm):  approx 2 x 0.5 x 0.1cm to left thigh, 0.5 x 1 x 0cm to right thigh  Wound Base: pink dry tissue  Palpation of the wound bed: normal   Periwound skin: intact  Color: normal and consistent with surrounding tissue  Temperature: normal   Drainage: none noted  Odor: none  Pain: minimal    Wound Location:  Right heel  Date of last photo:  6-2-21      Wound History: present on admission.  Pt can slightly move legs but cannot easily reposition them on his own.    Measurements (length x width x depth, in cm):   Approx 1.5 x 1.5 x 0.2cm open area and 4 x 5cm general healing area/ debrided prior blister  Wound Base: pink-red moist friable  Tunneling: N/A  Undermining: N/A  Palpation of the wound bed: normal   Periwound skin: pink newly resurfaced tissue, remnants of blistered tissue at edges  Color: pink  Temperature: normal   Drainage: small  Description of drainage: bloody  Odor: none  Pain: mild      Wound Location:  Left heel  Date of last photo:  6-2-21      Wound History: present on admission  Measurements (length x width x depth, in cm): approx 2 x 2cm intact  Wound Base: intact scarred/calloused firm tissue, looks newly healed over  Tunneling: N/A  Undermining: N/A  Palpation of the wound bed: firm   Periwound skin: flaky, scabby, dry, peely  Color: normal and consistent with surrounding tissue  Temperature: normal   Drainage: none  Odor: none  Pain: denies      Wound Location:  Left proximal posterior/lateral calf  Date of last photo:  6-2-21      Wound History: pt states is from minor trauma some years ago and area has had trouble staying healed  Measurements (length x width x depth, in cm):  approx 5 x 5cm, mostly intact, proximal superficial open area 1.2 x 1.2 x 0.1cm   Wound Base: mostly pink-purple-brown scar tissue with scant pale pink and red moist tissue  Palpation of the wound bed: normal   Periwound skin: dry/scaly  Color: normal and consistent with surrounding tissue  Temperature: normal   Drainage: scant  Description of drainage: serosanguinous  Odor: none  Pain: minimal    Interventions  Current support surface: Pulsate  Current off-loading measures: Pillows    Visual inspection of wound(s) completed  Wound Care: done per plan of care  Supplies: reviewed    Education provided: importance of repositioning, plan of care, wound progress, Infection prevention , Moisture management, Hygiene and Off-loading pressure  Discussed plan of care with Patient and Nurse    Annette Dove RN, CWOCN

## 2021-06-03 LAB
LABORATORY COMMENT REPORT: NORMAL
SARS-COV-2 RNA RESP QL NAA+PROBE: NEGATIVE
SPECIMEN SOURCE: NORMAL

## 2021-06-03 PROCEDURE — 87635 SARS-COV-2 COVID-19 AMP PRB: CPT | Performed by: INTERNAL MEDICINE

## 2021-06-03 PROCEDURE — 250N000013 HC RX MED GY IP 250 OP 250 PS 637: Performed by: INTERNAL MEDICINE

## 2021-06-03 PROCEDURE — 99207 PR CDG-CODE CATEGORY CHANGED: CPT | Performed by: INTERNAL MEDICINE

## 2021-06-03 PROCEDURE — G0378 HOSPITAL OBSERVATION PER HR: HCPCS

## 2021-06-03 PROCEDURE — 99224 PR SUBSEQUENT OBSERVATION CARE,LEVEL I: CPT | Performed by: INTERNAL MEDICINE

## 2021-06-03 RX ADMIN — OXYCODONE HYDROCHLORIDE 5 MG: 5 TABLET ORAL at 09:55

## 2021-06-03 RX ADMIN — GABAPENTIN 900 MG: 300 CAPSULE ORAL at 09:54

## 2021-06-03 RX ADMIN — OXYCODONE HYDROCHLORIDE 5 MG: 5 TABLET ORAL at 21:13

## 2021-06-03 RX ADMIN — LEVETIRACETAM 500 MG: 500 TABLET, FILM COATED ORAL at 05:09

## 2021-06-03 RX ADMIN — DOXYCYCLINE 100 MG: 100 CAPSULE ORAL at 09:55

## 2021-06-03 RX ADMIN — FINGOLIMOD HCL 0.5 MG: 0.5 CAPSULE ORAL at 12:11

## 2021-06-03 RX ADMIN — GABAPENTIN 900 MG: 300 CAPSULE ORAL at 13:30

## 2021-06-03 RX ADMIN — POTASSIUM CHLORIDE 10 MEQ: 750 TABLET, EXTENDED RELEASE ORAL at 09:55

## 2021-06-03 RX ADMIN — FUROSEMIDE 20 MG: 20 TABLET ORAL at 09:55

## 2021-06-03 RX ADMIN — DULOXETINE HYDROCHLORIDE 30 MG: 30 CAPSULE, DELAYED RELEASE PELLETS ORAL at 09:55

## 2021-06-03 RX ADMIN — BACLOFEN 10 MG: 10 TABLET ORAL at 09:55

## 2021-06-03 RX ADMIN — LACTULOSE 30 ML: 10 SOLUTION ORAL at 09:54

## 2021-06-03 RX ADMIN — OXYCODONE HYDROCHLORIDE 5 MG: 5 TABLET ORAL at 17:08

## 2021-06-03 RX ADMIN — GABAPENTIN 900 MG: 300 CAPSULE ORAL at 17:08

## 2021-06-03 RX ADMIN — GABAPENTIN 900 MG: 300 CAPSULE ORAL at 21:14

## 2021-06-03 RX ADMIN — LEVETIRACETAM 500 MG: 500 TABLET, FILM COATED ORAL at 17:08

## 2021-06-03 RX ADMIN — OXYCODONE HYDROCHLORIDE 5 MG: 5 TABLET ORAL at 13:34

## 2021-06-03 RX ADMIN — BACLOFEN 10 MG: 10 TABLET ORAL at 21:14

## 2021-06-03 NOTE — PROGRESS NOTES
Alert and oriented to self. Communicates needs. Slept most of the shift. VSS. Turned and repositioned q 2h. Helm patent and draining, o/p adequate. No PRN meds given.

## 2021-06-03 NOTE — PROGRESS NOTES
"[facility]    Medicine Progress Note - Hospitalist Service       Date of Admission:  5/27/2021  Assessment & Plan       Rahul Ruffin is a 41 year old male who presented with need for placement      Principal problem:  Multiple sclerosis  Total cares, was living in care facility in [place] but wants to be placed here. Just got off plane and came to ED for placement. Reports no acute issues. Labs normal.  Mom says patient usually gets care at [facility] and [place].  I cannot access any records from him through epic.    PTA medications, including Fingolimod    Per [employer], \"[employer] has issued a letter to inform health care providers that post-marketing cases of clinically significant liver injury, including markedly elevated serum hepatic enzymes and elevated total bilirubin, have been reported in multiple sclerosis patients treated with Gilenya (fingolimod). Health care providers are advised to:   perform liver function tests, including liver transaminases and bilirubin, before starting treatment with Gilenya; at months 1, 3, 6, 9, and 12 during the first year of treatment; and at regular intervals thereafter until 2 months after Gilenya discontinuation.    monitor patients for signs and symptoms of liver injury, such as unexplained vomiting, abdominal pain, fatigue, anorexia, or jaundice and/or dark urine, during treatment with Gilenya    Checked liver panel    Social work following to help with placement.  Awaiting to hear on MA status      Chronic indwelling payne    UA with heavy pyuria, may be chronic. No culture done.    Patient is afebrile with normal WBC count.     Continue Doxycycline (Mom states he is currently on abx for UTI)     Chronic foot swelling  Neuropathic pain  Present x 8 months.     Tylenol PRN    He is already on maximal dose of gabapentin given 3 times daily.  Divided the dose to 4 times daily dosing, see if he has more consistent pain " "relief    Chronic wounds  Upper L lateral calf and R heel, states baseline.     Wound cares     COVID-19 negative on admission       Diet: Mechanical/Dental Soft Diet  Room Service    DVT Prophylaxis: Pneumatic Compression Devices  Helm Catheter: in place, indication: Retention  Code Status: Full Code           Disposition Plan   Expected discharge: TBD, patient is medically ready for discharge when MA pending or approved and facility is available  Entered: Sunni Avelar MD 06/03/2021, 8:50 AM     The patient's care was discussed with the Bedside Nurse and Patient.    Sunni Avelar MD  Hospitalist Service  [facility]  Contact information available via [facility] Paging/Directory    ______________________________________________________________________    Interval History   \"Sometimes my pain is controlled, and sometimes it is bad.\"  Patient is napping during mid-morning hours, says he got up to chair yesterday.  He says neuropathic pain in feet is about the same, no real change.  No new respiratory or GI complaints.    Data reviewed today: I reviewed all medications, new labs and imaging results over the last 24 hours. I personally reviewed no images or EKG's today.    Physical Exam   Vital Signs: Temp: 98.2  F (36.8  C) Temp src: Oral BP: 124/84 Pulse: 80   Resp: 16 SpO2: 97 % O2 Device: None (Room air)    Weight: 0 lbs 0 oz  General Appearance: Awake, alert  Respiratory: Clear to auscultation.  No respiratory distress  Cardiovascular: RRR  GI: Soft.  Non-distended  Skin: No rash on exposed skin.  There is no redness or warmth in the left antecubital fossa  Other: Has dressings on both lower extremities  Psych: Mood is very pleasant    Data   Recent Labs   Lab 06/02/21  0846   ALBUMIN 3.1*   PROTTOTAL 7.5   BILITOTAL 0.6   ALKPHOS 86   ALT 24   AST 21     No results found for this or any previous visit (from the past 24 hour(s)).  "

## 2021-06-04 PROCEDURE — 99207 PR CDG-CODE CATEGORY CHANGED: CPT | Performed by: INTERNAL MEDICINE

## 2021-06-04 PROCEDURE — 250N000013 HC RX MED GY IP 250 OP 250 PS 637: Performed by: INTERNAL MEDICINE

## 2021-06-04 PROCEDURE — G0378 HOSPITAL OBSERVATION PER HR: HCPCS

## 2021-06-04 PROCEDURE — 99225 PR SUBSEQUENT OBSERVATION CARE,LEVEL II: CPT | Performed by: INTERNAL MEDICINE

## 2021-06-04 RX ADMIN — PREGABALIN 75 MG: 25 CAPSULE ORAL at 21:31

## 2021-06-04 RX ADMIN — GABAPENTIN 900 MG: 300 CAPSULE ORAL at 12:18

## 2021-06-04 RX ADMIN — POTASSIUM CHLORIDE 10 MEQ: 750 TABLET, EXTENDED RELEASE ORAL at 08:32

## 2021-06-04 RX ADMIN — OXYCODONE HYDROCHLORIDE 5 MG: 5 TABLET ORAL at 08:33

## 2021-06-04 RX ADMIN — OXYCODONE HYDROCHLORIDE 5 MG: 5 TABLET ORAL at 12:18

## 2021-06-04 RX ADMIN — LEVETIRACETAM 500 MG: 500 TABLET, FILM COATED ORAL at 17:23

## 2021-06-04 RX ADMIN — OXYCODONE HYDROCHLORIDE 5 MG: 5 TABLET ORAL at 19:27

## 2021-06-04 RX ADMIN — BACLOFEN 10 MG: 10 TABLET ORAL at 21:31

## 2021-06-04 RX ADMIN — DULOXETINE HYDROCHLORIDE 30 MG: 30 CAPSULE, DELAYED RELEASE PELLETS ORAL at 08:32

## 2021-06-04 RX ADMIN — FUROSEMIDE 20 MG: 20 TABLET ORAL at 08:33

## 2021-06-04 RX ADMIN — DOXYCYCLINE 100 MG: 100 CAPSULE ORAL at 08:33

## 2021-06-04 RX ADMIN — OXYCODONE HYDROCHLORIDE 5 MG: 5 TABLET ORAL at 17:23

## 2021-06-04 RX ADMIN — LACTULOSE 30 ML: 10 SOLUTION ORAL at 08:33

## 2021-06-04 RX ADMIN — LEVETIRACETAM 500 MG: 500 TABLET, FILM COATED ORAL at 05:03

## 2021-06-04 RX ADMIN — FINGOLIMOD HCL 0.5 MG: 0.5 CAPSULE ORAL at 08:34

## 2021-06-04 RX ADMIN — PREGABALIN 75 MG: 25 CAPSULE ORAL at 17:23

## 2021-06-04 RX ADMIN — OXYCODONE HYDROCHLORIDE 5 MG: 5 TABLET ORAL at 21:32

## 2021-06-04 RX ADMIN — GABAPENTIN 900 MG: 300 CAPSULE ORAL at 08:33

## 2021-06-04 RX ADMIN — BACLOFEN 10 MG: 10 TABLET ORAL at 08:33

## 2021-06-04 NOTE — PLAN OF CARE
Pt A&Ox4 with periods of forgetfulness, baseline w/c bound, up with lift, bilat LE's weakness and spasticity, bilat UE tremors, payne patent, tolerating regular diet, total care, specialty mattress, turned and repositioned, wound dressings C,D,I, oxycodone for pain management to bilat feet, rooke boots in place, awaiting placement.

## 2021-06-04 NOTE — PLAN OF CARE
Pt A&Ox4 with forgetfulness, VSS, bilat UE tremors, bilat LE spasticity and muscle jerking, pt c/o bilat foot pain/burning, lyrica started, scheduled and prn oxycodone given, up with lift to personal electric wheelchair, tolerating regular diet, total care, air mattress, turn and repositioned, wound care to right heel and left calf done per plan of care. Pt awaiting placement.

## 2021-06-04 NOTE — PROGRESS NOTES
"[facility]    Medicine Progress Note - Hospitalist Service       Date of Admission:  5/27/2021  Assessment & Plan       Rahul Ruffin is a 41 year old male who presented with need for placement      Principal problem:  Multiple sclerosis  Total cares, was living in care facility in [place] but wants to be placed here. Just got off plane and came to ED for placement. Reports no acute issues. Labs normal.  Mom says patient usually gets care at [facility] and [place].  I cannot access any records from him through epic.    PTA medications, including Fingolimod    Per [employer], \"[employer] has issued a letter to inform health care providers that post-marketing cases of clinically significant liver injury, including markedly elevated serum hepatic enzymes and elevated total bilirubin, have been reported in multiple sclerosis patients treated with Gilenya (fingolimod). Health care providers are advised to:   perform liver function tests, including liver transaminases and bilirubin, before starting treatment with Gilenya; at months 1, 3, 6, 9, and 12 during the first year of treatment; and at regular intervals thereafter until 2 months after Gilenya discontinuation.    monitor patients for signs and symptoms of liver injury, such as unexplained vomiting, abdominal pain, fatigue, anorexia, or jaundice and/or dark urine, during treatment with Gilenya    Checked liver panel, it is wnl     Social work following to help with placement.  Awaiting to hear on MA status      Chronic indwelling payne    UA with heavy pyuria, may be chronic. No culture done.    Patient is afebrile with normal WBC count.     Continue Doxycycline (Mom states he is currently on abx for UTI)     Chronic foot swelling  Neuropathic pain  Present x 8 months.     Tylenol PRN    He is already on maximal dose of gabapentin given 3 times daily.  Divided the dose to 4 times daily dosing, but he reports ongoing, intractable " "pain    Change to Lyrica    Will begin 50 mg 3 times daily, can increase up to 100 mg TID, with dose increases every 3 or more days days based on response and tolerability     Chronic wounds  Upper L lateral calf and R heel, states baseline.     Wound cares     COVID-19 negative on admission       Diet: Mechanical/Dental Soft Diet  Room Service    DVT Prophylaxis: Pneumatic Compression Devices  Helm Catheter: in place, indication: Retention  Code Status: Full Code           Disposition Plan   Expected discharge: TBD, patient is medically ready for discharge when MA pending or approved and facility is available  Entered: Sunni Avelar MD 06/04/2021, 8:29 AM     The patient's care was discussed with the Bedside Nurse and Patient.    Sunni Avelar MD  Hospitalist Service  [facility]  Contact information available via [facility] Paging/Directory    ______________________________________________________________________    Interval History   \"That pain in my feet makes it feel like my life is unbearable, sometimes.\"  Patient says he continues to have intermittent pain in both feet, pain impairs his quality of life, he sometimes despair because of the pain.  He has no new respiratory or GI complaints.    Data reviewed today: I reviewed all medications, new labs and imaging results over the last 24 hours. I personally reviewed no images or EKG's today.    Physical Exam   Vital Signs: Temp: 98.4  F (36.9  C) Temp src: Oral BP: 117/72 Pulse: 74   Resp: 16 SpO2: 98 % O2 Device: None (Room air)    Weight: 0 lbs 0 oz  General Appearance: Awake, alert  Respiratory: Clear to auscultation.  No respiratory distress  Cardiovascular: RRR  GI: Soft.  Non-distended  Skin: No rash on exposed skin.    Other: Has dressings on both lower extremities  Psych: Mood is very pleasant    Data   Recent Labs   Lab 06/02/21  0846   ALBUMIN 3.1*   PROTTOTAL 7.5   BILITOTAL 0.6   ALKPHOS 86   ALT 24   AST 21     No results " found for this or any previous visit (from the past 24 hour(s)).

## 2021-06-04 NOTE — PROGRESS NOTES
Care Management Follow Up    Length of Stay (days): 0    Expected Discharge Date: 06/02/21     Concerns to be Addressed: discharge planning     Patient plan of care discussed at interdisciplinary rounds: Yes    Anticipated Discharge Disposition: Long Term Care     Anticipated Discharge Services:    Anticipated Discharge DME:      Patient/family educated on Medicare website which has current facility and service quality ratings: yes  Education Provided on the Discharge Plan:    Patient/Family in Agreement with the Plan: yes    Referrals Placed by CM/SW: Post Acute Facilities  Private pay costs discussed: Not applicable    Additional Information:  Call placed to [facility] and they cannot accept due to acuity on the unit and the wound care needed. Call to [facility] in [place] and at this time they are unable to accept MA pending. Call to [facility] on [place] and message left. Call to [facility], admissions will review and call writer back.    Call placed to Pito with financial counseling regarding MA tirso and requesting an update.       CORWIN Gutierrez

## 2021-06-04 NOTE — PROGRESS NOTES
Alert and oriented, with forgetfulness. Verbalizes needs. VSS. Turned and repositioned. Helm patent with adequate o/p. No PRN meds given. Slept most of the shift. Uneventful shift.

## 2021-06-05 LAB
ANION GAP SERPL CALCULATED.3IONS-SCNC: 5 MMOL/L (ref 3–14)
BUN SERPL-MCNC: 15 MG/DL (ref 7–30)
CALCIUM SERPL-MCNC: 9.3 MG/DL (ref 8.5–10.1)
CHLORIDE SERPL-SCNC: 103 MMOL/L (ref 94–109)
CO2 SERPL-SCNC: 28 MMOL/L (ref 20–32)
CREAT SERPL-MCNC: 0.79 MG/DL (ref 0.66–1.25)
GFR SERPL CREATININE-BSD FRML MDRD: >90 ML/MIN/{1.73_M2}
GLUCOSE SERPL-MCNC: 77 MG/DL (ref 70–99)
POTASSIUM SERPL-SCNC: 3.9 MMOL/L (ref 3.4–5.3)
SODIUM SERPL-SCNC: 136 MMOL/L (ref 133–144)

## 2021-06-05 PROCEDURE — 80048 BASIC METABOLIC PNL TOTAL CA: CPT | Performed by: INTERNAL MEDICINE

## 2021-06-05 PROCEDURE — 250N000013 HC RX MED GY IP 250 OP 250 PS 637: Performed by: INTERNAL MEDICINE

## 2021-06-05 PROCEDURE — G0378 HOSPITAL OBSERVATION PER HR: HCPCS

## 2021-06-05 PROCEDURE — 99207 PR CDG-CODE CATEGORY CHANGED: CPT | Performed by: INTERNAL MEDICINE

## 2021-06-05 PROCEDURE — 99225 PR SUBSEQUENT OBSERVATION CARE,LEVEL II: CPT | Performed by: INTERNAL MEDICINE

## 2021-06-05 PROCEDURE — 36415 COLL VENOUS BLD VENIPUNCTURE: CPT | Performed by: INTERNAL MEDICINE

## 2021-06-05 RX ORDER — BACLOFEN 10 MG/1
10 TABLET ORAL 3 TIMES DAILY
Status: DISCONTINUED | OUTPATIENT
Start: 2021-06-05 | End: 2021-06-21 | Stop reason: HOSPADM

## 2021-06-05 RX ADMIN — DOXYCYCLINE 100 MG: 100 CAPSULE ORAL at 08:53

## 2021-06-05 RX ADMIN — OXYCODONE HYDROCHLORIDE 5 MG: 5 TABLET ORAL at 01:09

## 2021-06-05 RX ADMIN — OXYCODONE HYDROCHLORIDE 5 MG: 5 TABLET ORAL at 06:07

## 2021-06-05 RX ADMIN — POTASSIUM CHLORIDE 10 MEQ: 750 TABLET, EXTENDED RELEASE ORAL at 08:52

## 2021-06-05 RX ADMIN — OXYCODONE HYDROCHLORIDE 5 MG: 5 TABLET ORAL at 22:07

## 2021-06-05 RX ADMIN — LEVETIRACETAM 500 MG: 500 TABLET, FILM COATED ORAL at 17:30

## 2021-06-05 RX ADMIN — PREGABALIN 75 MG: 25 CAPSULE ORAL at 22:07

## 2021-06-05 RX ADMIN — DULOXETINE HYDROCHLORIDE 30 MG: 30 CAPSULE, DELAYED RELEASE PELLETS ORAL at 08:53

## 2021-06-05 RX ADMIN — OXYCODONE HYDROCHLORIDE 5 MG: 5 TABLET ORAL at 08:52

## 2021-06-05 RX ADMIN — BACLOFEN 10 MG: 10 TABLET ORAL at 17:30

## 2021-06-05 RX ADMIN — LEVETIRACETAM 500 MG: 500 TABLET, FILM COATED ORAL at 06:04

## 2021-06-05 RX ADMIN — FINGOLIMOD HCL 0.5 MG: 0.5 CAPSULE ORAL at 08:55

## 2021-06-05 RX ADMIN — PREGABALIN 75 MG: 25 CAPSULE ORAL at 17:30

## 2021-06-05 RX ADMIN — BACLOFEN 10 MG: 10 TABLET ORAL at 08:53

## 2021-06-05 RX ADMIN — ACETAMINOPHEN 650 MG: 325 TABLET, FILM COATED ORAL at 06:27

## 2021-06-05 RX ADMIN — OXYCODONE HYDROCHLORIDE 5 MG: 5 TABLET ORAL at 17:30

## 2021-06-05 RX ADMIN — PREGABALIN 75 MG: 25 CAPSULE ORAL at 08:53

## 2021-06-05 RX ADMIN — LACTULOSE 30 ML: 10 SOLUTION ORAL at 08:52

## 2021-06-05 RX ADMIN — BACLOFEN 10 MG: 10 TABLET ORAL at 22:07

## 2021-06-05 RX ADMIN — FUROSEMIDE 20 MG: 20 TABLET ORAL at 08:53

## 2021-06-05 NOTE — PROGRESS NOTES
"[facility]    Medicine Progress Note - Hospitalist Service       Date of Admission:  5/27/2021  Assessment & Plan       Rahul Ruffin is a 41 year old male who presented with need for placement.    Principal problem:  Multiple sclerosis  Total cares, was living in care facility in [place] but wants to be placed here. Just got off plane and came to ED for placement. Reports no acute issues. Labs normal.  Mom says patient usually gets care at [facility] and [place].  I cannot access any records from him through epic.    PTA medications, including Fingolimod    Per [employer], \"[employer] has issued a letter to inform health care providers that post-marketing cases of clinically significant liver injury, including markedly elevated serum hepatic enzymes and elevated total bilirubin, have been reported in multiple sclerosis patients treated with Gilenya (fingolimod). Health care providers are advised to:   perform liver function tests, including liver transaminases and bilirubin, before starting treatment with Gilenya; at months 1, 3, 6, 9, and 12 during the first year of treatment; and at regular intervals thereafter until 2 months after Gilenya discontinuation.    monitor patients for signs and symptoms of liver injury, such as unexplained vomiting, abdominal pain, fatigue, anorexia, or jaundice and/or dark urine, during treatment with Gilenya    Checked liver panel, it is wnl     Social work following to help with placement.  Awaiting to hear on MA status      Chronic indwelling payne    UA with heavy pyuria, may be chronic. No culture done.    Patient is afebrile with normal WBC count.     Continue Doxycycline (Mom states he is currently on abx for UTI)     Chronic foot swelling  Neuropathic pain  Present x 8 months.     Tylenol PRN    He is already on maximal dose of gabapentin given 3 times daily.  Divided the dose to 4 times daily dosing, but he reports ongoing, intractable " "pain    Change to Lyrica    Will begin 50 mg 3 times daily, can increase up to 100 mg TID, with dose increases every 3 or more days days based on response and tolerability     Nursing notes indicate patient had \"bilateral foot jerking.\"  He is on a very low-dose of baclofen, increased to 10 mg 3 times daily    Chronic wounds  Upper L lateral calf and R heel, states baseline.     Wound cares     COVID-19 negative on admission       Diet: Mechanical/Dental Soft Diet  Room Service    DVT Prophylaxis: Pneumatic Compression Devices  Heml Catheter: in place, indication: Retention  Code Status: Full Code           Disposition Plan   Expected discharge: TBD, patient is medically ready for discharge when MA pending or approved and facility is available  Entered: Sunni Avelar MD 06/05/2021, 8:31 AM     The patient's care was discussed with the Bedside Nurse and Patient.    Sunni Avelar MD  Hospitalist Service  [facility]  Contact information available via [facility] Paging/Directory    ______________________________________________________________________    Interval History   \" That pain in my feet is the same, it comes and goes.\"  Patient says pain in his feet is unchanged.  He does not notice any new problems or side effects associated with using Lyrica.  He has no new respiratory or GI complaints    Data reviewed today: I reviewed all medications, new labs and imaging results over the last 24 hours. I personally reviewed no images or EKG's today.    Physical Exam   Vital Signs: Temp: 98.2  F (36.8  C) Temp src: Oral BP: 122/85 Pulse: 78   Resp: 16 SpO2: 97 % O2 Device: None (Room air)    Weight: 0 lbs 0 oz  General Appearance: Awake, alert  Respiratory: Clear to auscultation.  No respiratory distress  Cardiovascular: RRR  GI: Soft.  Non-distended  Skin: No rash on exposed skin.    Other: Has dressings on both lower extremities  Psych: Mood is very pleasant    Data   Recent Labs   Lab " 06/05/21  0621 06/02/21  0846     --    POTASSIUM 3.9  --    CHLORIDE 103  --    CO2 28  --    BUN 15  --    CR 0.79  --    ANIONGAP 5  --    SALINAS 9.3  --    GLC 77  --    ALBUMIN  --  3.1*   PROTTOTAL  --  7.5   BILITOTAL  --  0.6   ALKPHOS  --  86   ALT  --  24   AST  --  21     No results found for this or any previous visit (from the past 24 hour(s)).

## 2021-06-05 NOTE — PLAN OF CARE
Pt A&Ox4 with forgetfulness, VSS, bilat UE tremors, bilat LE spasticity with touch, pt c/o bilat foot pain/burning, lyrica has improved symptoms slightly, scheduled and prn oxycodone given, up with lift to personal electric wheelchair, tolerating regular diet, total care, air mattress, turn and repositioned,  payne had some leaking this am, irrigated x1, leaking resolved, right heel and left calf dressings intact, pt reopened scab to right great toe, awaiting placement.

## 2021-06-05 NOTE — PROGRESS NOTES
Pt was in his electric wheelchair, turned and bumped his right great toe and reopened scab, small amount of bleeding, no other apparent injury, cleansed and covered with polymem for protection. Hospitalist notified.

## 2021-06-06 PROCEDURE — 250N000013 HC RX MED GY IP 250 OP 250 PS 637: Performed by: INTERNAL MEDICINE

## 2021-06-06 PROCEDURE — 99207 PR CDG-CODE CATEGORY CHANGED: CPT | Performed by: INTERNAL MEDICINE

## 2021-06-06 PROCEDURE — 99225 PR SUBSEQUENT OBSERVATION CARE,LEVEL II: CPT | Performed by: INTERNAL MEDICINE

## 2021-06-06 PROCEDURE — G0378 HOSPITAL OBSERVATION PER HR: HCPCS

## 2021-06-06 RX ADMIN — FINGOLIMOD HCL 0.5 MG: 0.5 CAPSULE ORAL at 08:17

## 2021-06-06 RX ADMIN — DULOXETINE HYDROCHLORIDE 30 MG: 30 CAPSULE, DELAYED RELEASE PELLETS ORAL at 08:07

## 2021-06-06 RX ADMIN — LEVETIRACETAM 500 MG: 500 TABLET, FILM COATED ORAL at 06:46

## 2021-06-06 RX ADMIN — OXYCODONE HYDROCHLORIDE 5 MG: 5 TABLET ORAL at 15:43

## 2021-06-06 RX ADMIN — FUROSEMIDE 20 MG: 20 TABLET ORAL at 08:08

## 2021-06-06 RX ADMIN — DOXYCYCLINE 100 MG: 100 CAPSULE ORAL at 08:07

## 2021-06-06 RX ADMIN — PREGABALIN 75 MG: 25 CAPSULE ORAL at 08:07

## 2021-06-06 RX ADMIN — PREGABALIN 75 MG: 25 CAPSULE ORAL at 22:15

## 2021-06-06 RX ADMIN — BACLOFEN 10 MG: 10 TABLET ORAL at 22:15

## 2021-06-06 RX ADMIN — PREGABALIN 75 MG: 25 CAPSULE ORAL at 15:42

## 2021-06-06 RX ADMIN — POTASSIUM CHLORIDE 10 MEQ: 750 TABLET, EXTENDED RELEASE ORAL at 08:08

## 2021-06-06 RX ADMIN — BACLOFEN 10 MG: 10 TABLET ORAL at 15:43

## 2021-06-06 RX ADMIN — LACTULOSE 30 ML: 10 SOLUTION ORAL at 08:07

## 2021-06-06 RX ADMIN — OXYCODONE HYDROCHLORIDE 5 MG: 5 TABLET ORAL at 00:42

## 2021-06-06 RX ADMIN — OXYCODONE HYDROCHLORIDE 5 MG: 5 TABLET ORAL at 20:37

## 2021-06-06 RX ADMIN — ACETAMINOPHEN 650 MG: 325 TABLET, FILM COATED ORAL at 00:42

## 2021-06-06 RX ADMIN — BACLOFEN 10 MG: 10 TABLET ORAL at 08:08

## 2021-06-06 RX ADMIN — OXYCODONE HYDROCHLORIDE 5 MG: 5 TABLET ORAL at 08:08

## 2021-06-06 RX ADMIN — ACETAMINOPHEN 650 MG: 325 TABLET, FILM COATED ORAL at 20:37

## 2021-06-06 NOTE — PROGRESS NOTES
Observation goals PRIOR TO DISCHARGE     Comments: -diagnostic tests and consults completed and resulted : Met  -vital signs normal or at patient baseline : Met  -infection is improving : Met  -safe disposition plan has been identified : Not Met, placement pending  Nurse to notify provider when observation goals have been met and patient is ready for discharge

## 2021-06-06 NOTE — PLAN OF CARE
Patient is A&O, forgetful. Denies chest pain or SOB. Turn and repo per pt's comfort. Dressing on R heel, changed, CDI. Cleansed and applied lotion on L heel and sacrum/coccyx per plan of care, applied new inter dry on groin. Helm catheter patent, assisted with feeding. Will continue to monitor.

## 2021-06-06 NOTE — PLAN OF CARE
Pt A&O x4, forgetful. VSS on RA. WC bound at baseline. Up w/ lift. Turn/repo Q 2 hrs. Tremors to BUE and spasticity to BLE. Pain to bilateral feet managed w/ scheduled lyrica and oyxcodone. Prn oxycodone given x1. Helm in place for chronic retention. Dressing to rt heel CDI, dressing to L calf changed. Dressing to rt toe CDI. Discharge pending placement.

## 2021-06-06 NOTE — PROGRESS NOTES
"[facility]    Medicine Progress Note - Hospitalist Service       Date of Admission:  5/27/2021  Assessment & Plan       Rahul Ruffin is a 41 year old male who presented with need for placement.    Principal problem:  Multiple sclerosis  Total cares, was living in care facility in [place] but wants to be placed here. Just got off plane and came to ED for placement. Reports no acute issues. Labs normal.  Mom says patient usually gets care at [facility] and [place].  I cannot access any records from him through epic.    PTA medications, including Fingolimod    Per [employer], \"[employer] has issued a letter to inform health care providers that post-marketing cases of clinically significant liver injury, including markedly elevated serum hepatic enzymes and elevated total bilirubin, have been reported in multiple sclerosis patients treated with Gilenya (fingolimod). Health care providers are advised to:   perform liver function tests, including liver transaminases and bilirubin, before starting treatment with Gilenya; at months 1, 3, 6, 9, and 12 during the first year of treatment; and at regular intervals thereafter until 2 months after Gilenya discontinuation.    monitor patients for signs and symptoms of liver injury, such as unexplained vomiting, abdominal pain, fatigue, anorexia, or jaundice and/or dark urine, during treatment with Gilenya\"    Checked liver panel, it is wnl     Social work following to help with placement.  Waiting to hear on MA status      Chronic indwelling payne    UA with heavy pyuria, likely chronic. No culture done.    Patient is afebrile with normal WBC count.     Continue Doxycycline (Mom states he is currently on abx for UTI)     Chronic foot swelling  Neuropathic pain  Present x 8 months.     Tylenol PRN    He is already on maximal dose of gabapentin given 3 times daily.  Divided the dose to 4 times daily dosing, but he reports ongoing, intractable " "pain    Change to Lyrica    Started 75 mg 3 times daily on 6/4, will increase to 75/75/150 mg tomorrow    Nursing notes indicate patient had \"bilateral foot jerking.\"  He has been on a very low-dose of baclofen, increased to 10 mg 3 times daily    Chronic wounds  Upper L lateral calf and R heel, states baseline.     Wound cares     COVID-19 negative on admission       Diet: Mechanical/Dental Soft Diet  Room Service    DVT Prophylaxis: Pneumatic Compression Devices  Helm Catheter: in place, indication: Retention(MS)  Code Status: Full Code           Disposition Plan   Expected discharge: TBD, patient is medically ready for discharge when MA pending or approved and facility is available  Entered: Sunni Avelar MD 06/06/2021, 9:03 AM     The patient's care was discussed with the Bedside Nurse and Patient.    Sunni Avelar MD  Hospitalist Service  [facility]  Contact information available via [facility] Paging/Directory    ______________________________________________________________________    Interval History   \"It's okay if you wake me up.\"  (Patient likes to sleep late in the morning.)  He says pain in his feet is unchanged, I.e. not better, not worse.  He has no new respiratory or GI complaints.    Data reviewed today: I reviewed all medications, new labs and imaging results over the last 24 hours. I personally reviewed no images or EKG's today.    Physical Exam   Vital Signs: Temp: 97.9  F (36.6  C) Temp src: Oral BP: 112/80 Pulse: 77   Resp: 16 SpO2: 96 % O2 Device: None (Room air)    Weight: 0 lbs 0 oz  General Appearance: Dozing, has bedcovers over his head!  Wakes to voice.  Respiratory: Clear to auscultation.  No respiratory distress  Cardiovascular: RRR  GI: Soft.  Non-distended  Skin: No rash on exposed skin.    Other: Has dressings on both lower extremities.  Has a bandaid on the right great toe  Psych: Mood is very pleasant    Data   Recent Labs   Lab 06/05/21  0621 " 06/02/21  0846     --    POTASSIUM 3.9  --    CHLORIDE 103  --    CO2 28  --    BUN 15  --    CR 0.79  --    ANIONGAP 5  --    SALINAS 9.3  --    GLC 77  --    ALBUMIN  --  3.1*   PROTTOTAL  --  7.5   BILITOTAL  --  0.6   ALKPHOS  --  86   ALT  --  24   AST  --  21     No results found for this or any previous visit (from the past 24 hour(s)).

## 2021-06-07 PROCEDURE — 250N000013 HC RX MED GY IP 250 OP 250 PS 637: Performed by: INTERNAL MEDICINE

## 2021-06-07 PROCEDURE — 99225 PR SUBSEQUENT OBSERVATION CARE,LEVEL II: CPT | Performed by: HOSPITALIST

## 2021-06-07 PROCEDURE — G0378 HOSPITAL OBSERVATION PER HR: HCPCS

## 2021-06-07 RX ADMIN — ACETAMINOPHEN 650 MG: 325 TABLET, FILM COATED ORAL at 01:35

## 2021-06-07 RX ADMIN — DOXYCYCLINE 100 MG: 100 CAPSULE ORAL at 08:10

## 2021-06-07 RX ADMIN — PREGABALIN 75 MG: 25 CAPSULE ORAL at 15:08

## 2021-06-07 RX ADMIN — OXYCODONE HYDROCHLORIDE 10 MG: 5 TABLET ORAL at 22:39

## 2021-06-07 RX ADMIN — DULOXETINE HYDROCHLORIDE 30 MG: 30 CAPSULE, DELAYED RELEASE PELLETS ORAL at 08:10

## 2021-06-07 RX ADMIN — FINGOLIMOD HCL 0.5 MG: 0.5 CAPSULE ORAL at 08:12

## 2021-06-07 RX ADMIN — OXYCODONE HYDROCHLORIDE 10 MG: 5 TABLET ORAL at 06:24

## 2021-06-07 RX ADMIN — OXYCODONE HYDROCHLORIDE 5 MG: 5 TABLET ORAL at 13:29

## 2021-06-07 RX ADMIN — BACLOFEN 10 MG: 10 TABLET ORAL at 15:08

## 2021-06-07 RX ADMIN — BACLOFEN 10 MG: 10 TABLET ORAL at 08:12

## 2021-06-07 RX ADMIN — OXYCODONE HYDROCHLORIDE 5 MG: 5 TABLET ORAL at 08:10

## 2021-06-07 RX ADMIN — POTASSIUM CHLORIDE 10 MEQ: 750 TABLET, EXTENDED RELEASE ORAL at 08:10

## 2021-06-07 RX ADMIN — OXYCODONE HYDROCHLORIDE 5 MG: 5 TABLET ORAL at 01:35

## 2021-06-07 RX ADMIN — OXYCODONE HYDROCHLORIDE 5 MG: 5 TABLET ORAL at 21:19

## 2021-06-07 RX ADMIN — FUROSEMIDE 20 MG: 20 TABLET ORAL at 08:10

## 2021-06-07 RX ADMIN — ACETAMINOPHEN 650 MG: 325 TABLET, FILM COATED ORAL at 11:04

## 2021-06-07 RX ADMIN — ACETAMINOPHEN 650 MG: 325 TABLET, FILM COATED ORAL at 15:08

## 2021-06-07 RX ADMIN — BACLOFEN 10 MG: 10 TABLET ORAL at 21:19

## 2021-06-07 RX ADMIN — OXYCODONE HYDROCHLORIDE 10 MG: 5 TABLET ORAL at 16:26

## 2021-06-07 RX ADMIN — ACETAMINOPHEN 650 MG: 325 TABLET, FILM COATED ORAL at 06:24

## 2021-06-07 RX ADMIN — PREGABALIN 75 MG: 25 CAPSULE ORAL at 08:10

## 2021-06-07 RX ADMIN — PREGABALIN 150 MG: 100 CAPSULE ORAL at 21:19

## 2021-06-07 NOTE — PROGRESS NOTES
Pt A&O x3, forgetful. VSS on RA. Up w/ lift. Turn/repo q 2 hrs. Tremors to BUE and spasticity to BLE. Had increased pain prn tylenol and oxycodone given. Also has scheduled oxycodone. Helm patent. Wound to rt heel and left calf covered w/ foam, CDI. Discharge pending placement.

## 2021-06-07 NOTE — PROGRESS NOTES
Care Management Follow Up    Length of Stay (days): 0    Expected Discharge Date: 06/09/21     Concerns to be Addressed: discharge planning     Patient plan of care discussed at interdisciplinary rounds: Yes    Anticipated Discharge Disposition: Long Term Care     Anticipated Discharge Services:    Anticipated Discharge DME:      Patient/family educated on Medicare website which has current facility and service quality ratings: yes  Education Provided on the Discharge Plan:    Patient/Family in Agreement with the Plan: yes    Referrals Placed by CM/SW: Post Acute Facilities  Private pay costs discussed: Not applicable    Additional Information:  Call placed to Pito with [facility] regarding MA application. Writer informed that she has not heard back from her urgent message left Friday but will reach out to find the status. She will update writer with any new updates.    Update: referrals sent to [facility] and [facility] for placement followed with a message to both.       CORWIN Gutierrez

## 2021-06-07 NOTE — PLAN OF CARE
Pt A&Ox4, forgetful at times. VSS. Up w/ lift-turned q2h. Taking oxy and tylenol for pain. Voiding per payne. Continue to monitor.

## 2021-06-07 NOTE — PROGRESS NOTES
"[facility]  Medicine Progress Note - Hospitalist Service       Date of Admission:  5/27/2021  Assessment & Plan             Rahul Ruffin is a 41 year old male who presented with need for placement.    Principal problem:  Multiple sclerosis  Total cares, was living in care facility in [place] but wants to be placed here. Just got off plane and came to ED for placement. Reports no acute issues. Labs normal.  Mom says patient usually gets care at [facility] and [place]. Not able to access any records from him through epic.    Continue PTA medications, including Fingolimod, monitor patients for signs and symptoms of liver injury, such as unexplained vomiting, abdominal pain, fatigue, anorexia, or jaundice and/or dark urine, during treatment with Gilenya with periodic LFTs as recommended during and after treatment. LFTs normal.    Social work following to help with placement. Waiting to hear on MA status      Chronic indwelling payne    UA with heavy pyuria, likely chronic. No culture done.    Patient is afebrile with normal WBC count.     Continue Doxycycline (Mom states he is currently on abx for UTI)     Chronic foot swelling  Neuropathic pain  Present x 8 months.     Tylenol PRN    He is already on maximal dose of gabapentin given 3 times daily.  Divided the dose to 4 times daily dosing, but he reports ongoing, pain    Changed to Lyrica, Started 75 mg 3 times daily on 6/4, increased to 75/75/150 mg now    Nursing notes indicate patient had \"bilateral foot jerking.\"  He has been on a very low-dose of baclofen, increased to 10 mg 3 times daily    Chronic wounds  Upper L lateral calf and R heel, states baseline.     Wound cares     COVID-19 negative on admission     Diet: Mechanical/Dental Soft Diet  Room Service    DVT Prophylaxis: Pneumatic Compression Devices  Payne Catheter: in place, indication: Retention(chronic)  Code Status: Full Code         Disposition Plan   Expected " discharge: TBD, recommended to care facility once MA pending and bed available at care facility.  Entered: Phuong Joaquin MD 06/07/2021, 10:25 AM     The patient's care was discussed with the Bedside Nurse and Patient.    Phuong Joaquin MD  Hospitalist Service  [facility]  Contact information available via [facility] Paging/Directory    ______________________________________________________________________    Interval History   No acute issues or concern reported by patient or RN. Reported chronic foot pain.     Data reviewed today: I reviewed all medications, new labs and imaging results over the last 24 hours. I personally reviewed no images or EKG's today.    Physical Exam   Vital Signs: Temp: 98.1  F (36.7  C) Temp src: Axillary BP: (!) 128/91 Pulse: 80   Resp: 16 SpO2: 94 % O2 Device: None (Room air)    Weight: 0 lbs 0 oz    General: AAOx3, appears comfortable.  HEENT: PERRLA EOMI. Mucosa moist.   Lungs: Bilateral equal air entry. Clear to auscultation, normal work of breathing.   CVS: S1S2 regular, no tachycardia or murmur.   Abdomen: Soft, NT, ND. BS heard.  Neuro: AAOX3. CN 2-12 normal.   Skin: No rash.     Data   Recent Labs   Lab 06/05/21  0621 06/02/21  0846     --    POTASSIUM 3.9  --    CHLORIDE 103  --    CO2 28  --    BUN 15  --    CR 0.79  --    ANIONGAP 5  --    SALINAS 9.3  --    GLC 77  --    ALBUMIN  --  3.1*   PROTTOTAL  --  7.5   BILITOTAL  --  0.6   ALKPHOS  --  86   ALT  --  24   AST  --  21     No results found for this or any previous visit (from the past 24 hour(s)).  Medications       baclofen  10 mg Oral TID     doxycycline hyclate  100 mg Oral Daily     DULoxetine  30 mg Oral Daily     fingolimod  0.5 mg Oral Daily     furosemide  20 mg Oral Daily     lactulose  30 mL Oral Daily     oxyCODONE  5 mg Oral TID     potassium chloride  10 mEq Oral Daily     pregabalin  150 mg Oral At Bedtime     pregabalin  75 mg Oral BID

## 2021-06-08 PROCEDURE — 250N000013 HC RX MED GY IP 250 OP 250 PS 637: Performed by: INTERNAL MEDICINE

## 2021-06-08 PROCEDURE — 99225 PR SUBSEQUENT OBSERVATION CARE,LEVEL II: CPT | Performed by: HOSPITALIST

## 2021-06-08 PROCEDURE — G0378 HOSPITAL OBSERVATION PER HR: HCPCS

## 2021-06-08 PROCEDURE — 99207 PR CDG-MDM COMPONENT: MEETS MODERATE - UP CODED: CPT | Performed by: HOSPITALIST

## 2021-06-08 RX ADMIN — DOXYCYCLINE 100 MG: 100 CAPSULE ORAL at 08:44

## 2021-06-08 RX ADMIN — OXYCODONE HYDROCHLORIDE 5 MG: 5 TABLET ORAL at 23:48

## 2021-06-08 RX ADMIN — ACETAMINOPHEN 650 MG: 325 TABLET, FILM COATED ORAL at 19:52

## 2021-06-08 RX ADMIN — BACLOFEN 10 MG: 10 TABLET ORAL at 16:30

## 2021-06-08 RX ADMIN — FINGOLIMOD HCL 0.5 MG: 0.5 CAPSULE ORAL at 08:57

## 2021-06-08 RX ADMIN — LACTULOSE 30 ML: 10 SOLUTION ORAL at 08:43

## 2021-06-08 RX ADMIN — OXYCODONE HYDROCHLORIDE 5 MG: 5 TABLET ORAL at 08:44

## 2021-06-08 RX ADMIN — OXYCODONE HYDROCHLORIDE 10 MG: 5 TABLET ORAL at 14:35

## 2021-06-08 RX ADMIN — OXYCODONE HYDROCHLORIDE 5 MG: 5 TABLET ORAL at 19:52

## 2021-06-08 RX ADMIN — POTASSIUM CHLORIDE 10 MEQ: 750 TABLET, EXTENDED RELEASE ORAL at 08:43

## 2021-06-08 RX ADMIN — FUROSEMIDE 20 MG: 20 TABLET ORAL at 08:44

## 2021-06-08 RX ADMIN — DULOXETINE HYDROCHLORIDE 30 MG: 30 CAPSULE, DELAYED RELEASE PELLETS ORAL at 08:44

## 2021-06-08 RX ADMIN — BACLOFEN 10 MG: 10 TABLET ORAL at 22:01

## 2021-06-08 RX ADMIN — PREGABALIN 75 MG: 25 CAPSULE ORAL at 08:43

## 2021-06-08 RX ADMIN — OXYCODONE HYDROCHLORIDE 5 MG: 5 TABLET ORAL at 16:27

## 2021-06-08 RX ADMIN — OXYCODONE HYDROCHLORIDE 10 MG: 5 TABLET ORAL at 04:30

## 2021-06-08 RX ADMIN — PREGABALIN 150 MG: 100 CAPSULE ORAL at 22:01

## 2021-06-08 RX ADMIN — BACLOFEN 10 MG: 10 TABLET ORAL at 08:44

## 2021-06-08 RX ADMIN — PREGABALIN 75 MG: 25 CAPSULE ORAL at 16:28

## 2021-06-08 NOTE — PROGRESS NOTES
"[facility]  Medicine Progress Note - Hospitalist Service       Date of Admission:  5/27/2021  Assessment & Plan             Rahul Ruffin is a 41 year old male who presented with need for placement.    Principal problem:  Multiple sclerosis  Total cares, was living in care facility in [place] but wants to be placed here. Just got off plane and came to ED for placement. Reports no acute issues. Labs normal.  Mom says patient usually gets care at [facility] and [place]. Not able to access any records from him through epic.    Continue PTA medications, including Fingolimod, monitor patients for signs and symptoms of liver injury, such as unexplained vomiting, abdominal pain, fatigue, anorexia, or jaundice and/or dark urine, during treatment with Gilenya with periodic LFTs as recommended during and after treatment. LFTs normal.    Social work following to help with placement. Waiting to hear on MA status      Chronic indwelling payne    UA with heavy pyuria, likely chronic. No culture done.    Patient is afebrile with normal WBC count.     Continue Doxycycline (Mom states he is currently on abx for UTI)     Chronic foot swelling  Neuropathic pain  Present x 8 months.     Tylenol PRN    He is already on maximal dose of gabapentin given 3 times daily.  Divided the dose to 4 times daily dosing, but he reports ongoing, pain    Changed to Lyrica, Started 75 mg 3 times daily on 6/4, increased to 75/75/150 mg now    Nursing notes indicate patient had \"bilateral foot jerking.\"  He has been on a very low-dose of baclofen, increased to 10 mg 3 times daily    Chronic wounds  Upper L lateral calf and R heel, states baseline.     Wound cares     COVID-19 negative on admission     Diet: Mechanical/Dental Soft Diet  Room Service    DVT Prophylaxis: Pneumatic Compression Devices  Payne Catheter: in place, indication: Other (Comment)(chronic)  Code Status: Full Code         Disposition Plan   Expected " discharge: TBD, recommended to care facility once MA pending and bed available at care facility.  Entered: Phuong Joaquin MD 06/08/2021, 10:53 AM     The patient's care was discussed with the Bedside Nurse and Patient.    Phuong Joaquin MD  Hospitalist Service  [facility]  Contact information available via [facility] Paging/Directory    ______________________________________________________________________    Interval History   No acute issues or concern reported by patient or RN.   Has not felt any difference in terms of chronic bilateral leg/foot pain    Data reviewed today: I reviewed all medications, new labs and imaging results over the last 24 hours. I personally reviewed no images or EKG's today.    Physical Exam   Vital Signs: Temp: 98  F (36.7  C) Temp src: Oral BP: 137/87 Pulse: 80   Resp: 16 SpO2: 97 % O2 Device: None (Room air)    Weight: 0 lbs 0 oz    General: Alert awake, oriented.  HEENT: PERRLA EOMI. Lt strabismus present. Mucosa moist.   Lungs: Bilateral equal air entry. Clear to auscultation, normal work of breathing.   CVS: S1S2 regular, no tachycardia or murmur.   Abdomen: Soft, NT, ND. BS heard.  Neuro: alert awake, oriented, follows verbal commands appropriately. LUE 4+/5, Rt is 5/5, both legs are weak3+/5.   Skin: No rash.     Data   Recent Labs   Lab 06/05/21  0621 06/02/21  0846     --    POTASSIUM 3.9  --    CHLORIDE 103  --    CO2 28  --    BUN 15  --    CR 0.79  --    ANIONGAP 5  --    SALINAS 9.3  --    GLC 77  --    ALBUMIN  --  3.1*   PROTTOTAL  --  7.5   BILITOTAL  --  0.6   ALKPHOS  --  86   ALT  --  24   AST  --  21     No results found for this or any previous visit (from the past 24 hour(s)).  Medications       baclofen  10 mg Oral TID     doxycycline hyclate  100 mg Oral Daily     DULoxetine  30 mg Oral Daily     fingolimod  0.5 mg Oral Daily     furosemide  20 mg Oral Daily     lactulose  30 mL Oral Daily     oxyCODONE  5 mg Oral TID     potassium  chloride  10 mEq Oral Daily     pregabalin  150 mg Oral At Bedtime     pregabalin  75 mg Oral BID

## 2021-06-08 NOTE — PLAN OF CARE
Pt A/Ox4, VSS on RA, oxycodone given for pain, payne in use, turn and repo, will continue to monitor

## 2021-06-08 NOTE — PROGRESS NOTES
Care Management Follow Up    Length of Stay (days): 0    Expected Discharge Date: 06/09/21     Concerns to be Addressed: discharge planning     Patient plan of care discussed at interdisciplinary rounds: Yes    Anticipated Discharge Disposition: Long Term Care     Anticipated Discharge Services:  TBD  Anticipated Discharge DME:      Patient/family educated on Medicare website which has current facility and service quality ratings: yes  Education Provided on the Discharge Plan:    Patient/Family in Agreement with the Plan: yes    Referrals Placed by CM/SW: Post Acute Facilities  Private pay costs discussed: MA application    Additional Information:  SW received call from [facility], stating that they are not able to assess pt for their facilities until they have a copy of the MA application and 3 months of bank statements.  RUBIA contacted pt's mother Viry, and asked about the bank statements. She states that she is going to the bank tomorrow and is able to fax the bank statements' to RUBIA Kilgore at [phone]. Application is on front of pt chart and this info can be sent to [facility] for consideration.    CORWIN Childs  [facility]  [phone]

## 2021-06-08 NOTE — PLAN OF CARE
VSS. Up with 2 and lift. Pain managed with scheduled and PRN oxycodone. Dressing right heel and left calf changed today. Repo ever 2 hours, on pulsate mattress. Feeder and tolerating mechanical soft diet. A&OX4. Discharge pending placement.

## 2021-06-09 PROCEDURE — 250N000013 HC RX MED GY IP 250 OP 250 PS 637: Performed by: INTERNAL MEDICINE

## 2021-06-09 PROCEDURE — 99224 PR SUBSEQUENT OBSERVATION CARE,LEVEL I: CPT | Performed by: HOSPITALIST

## 2021-06-09 PROCEDURE — G0463 HOSPITAL OUTPT CLINIC VISIT: HCPCS

## 2021-06-09 PROCEDURE — G0378 HOSPITAL OBSERVATION PER HR: HCPCS

## 2021-06-09 RX ADMIN — LACTULOSE 30 ML: 10 SOLUTION ORAL at 09:20

## 2021-06-09 RX ADMIN — PREGABALIN 75 MG: 25 CAPSULE ORAL at 15:52

## 2021-06-09 RX ADMIN — FINGOLIMOD HCL 0.5 MG: 0.5 CAPSULE ORAL at 09:19

## 2021-06-09 RX ADMIN — OXYCODONE HYDROCHLORIDE 5 MG: 5 TABLET ORAL at 09:20

## 2021-06-09 RX ADMIN — POTASSIUM CHLORIDE 10 MEQ: 750 TABLET, EXTENDED RELEASE ORAL at 09:18

## 2021-06-09 RX ADMIN — OXYCODONE HYDROCHLORIDE 5 MG: 5 TABLET ORAL at 06:56

## 2021-06-09 RX ADMIN — PREGABALIN 75 MG: 25 CAPSULE ORAL at 09:17

## 2021-06-09 RX ADMIN — BACLOFEN 10 MG: 10 TABLET ORAL at 15:52

## 2021-06-09 RX ADMIN — OXYCODONE HYDROCHLORIDE 5 MG: 5 TABLET ORAL at 15:52

## 2021-06-09 RX ADMIN — BACLOFEN 10 MG: 10 TABLET ORAL at 09:19

## 2021-06-09 RX ADMIN — FUROSEMIDE 20 MG: 20 TABLET ORAL at 09:18

## 2021-06-09 RX ADMIN — ACETAMINOPHEN 650 MG: 325 TABLET, FILM COATED ORAL at 06:56

## 2021-06-09 RX ADMIN — OXYCODONE HYDROCHLORIDE 5 MG: 5 TABLET ORAL at 21:17

## 2021-06-09 RX ADMIN — DOXYCYCLINE 100 MG: 100 CAPSULE ORAL at 09:17

## 2021-06-09 RX ADMIN — ACETAMINOPHEN 650 MG: 325 TABLET, FILM COATED ORAL at 14:09

## 2021-06-09 RX ADMIN — BACLOFEN 10 MG: 10 TABLET ORAL at 21:17

## 2021-06-09 RX ADMIN — OXYCODONE HYDROCHLORIDE 10 MG: 5 TABLET ORAL at 14:09

## 2021-06-09 RX ADMIN — DULOXETINE HYDROCHLORIDE 30 MG: 30 CAPSULE, DELAYED RELEASE PELLETS ORAL at 09:18

## 2021-06-09 RX ADMIN — PREGABALIN 150 MG: 100 CAPSULE ORAL at 21:17

## 2021-06-09 NOTE — PROGRESS NOTES
[facility] Nurse Inpatient Wound Assessment     Reason for consultation: Evaluate and treat coccyx, posterior thighs, heels, left calf, penile meatus; newer right groin and right great toe wounds    Assessment  -Coccyx: basically intact, with scattered superficial friction/shear injuries that are mostly resurfaced.  There is likely pressure component as well, but no obvious pressure injuries present at this time.  Pt now on low air loss mattress and skin overall improving.  Discontinued dressings last week to focus on conditioning and moisturizing the skin; this plan seems working well, no new deterioration.   -Posterior upper thighs: superficial shearing injuries likely from briefs, L>R, nearly healed  -Right heel: Stage 2 vs healing Stage 3 CAPI; healing, shallow and clean  -Left heel: intact, newly resurfaced tissue, evidence of prior pressure injury  -Left lateral calf: old trauma injury that reopens per pt; scarred with small superficial open tissue at present  -Penile meatus: mild enlargement of meatal opening from chronic catheter; no wounds present, scant drainage this week  -Right inner groin: small wound at base of fold, unclear etiology, possible mechanical splitting of tissue, no s/s infection  -Right great toe: previous thick scabbing/ dry tissue has sloughed off to reveal small medial open area along nailbed, unclear etiology, possible prior ingrown toenail, no acute s/s infection but will need to monitor    Pt has MS and is wheel-chair bound.  At risk for further skin breakdown.  Pulsate mattress and Prevalon boots in place.     Treatment Plan  Coccyx/sacrum, buttocks, thighs: BID and prn:   1.  Cleanse with John perineal lotion and soft dry wipes  2.  Apply Sween 24 cream to all areas, except for any deep/moist skin folds  3.  Leave open to air   4.  If using briefs, ensure are loose/open under pt, and not digging into thighs  5.  PIP measures    Pressure Injury  Prevention (PIP) Plan:  -If patient is declining pressure injury prevention interventions: Explore reason why and address patient's concerns and Educate on pressure injury risk and prevention intervention(s)  -Mattress: Pulsate low air loss  -HOB: Maintain at or below 30 degrees, unless contraindicated  -Repositioning in bed: Every 1-2 hours , Left/right positioning; avoid supine and Raise foot of bed prior to raising head of bed, to reduce patient sliding down (shear)  -Heels: Keep elevated off mattress, Pillows under calves and Heel lift boots  -Protective Dressing: None - unless skin deteriorates, then can resume Mepilex  -Chair positioning: Assist patient to reposition hourly; use his own specialty wheelchair/cushion  -Moisture Management: Perineal cleansing /protection: Follow Incontinence Protocol, Clean and dry skin folds with bathing  and Moisturize dry skin  -Under Devices: Inspect skin under all medical devices during skin inspection , Ensure tubes are stabilized without tension and Ensure patient is not lying on medical devices or equipment when repositioned    Left lateral calf: every other day and prn:   1.  Cleanse with MicroKlenz  2.  Apply Optifoam Gentle Border foam dressing    Right heel: every other day and prn:  1.  Cleanse with MicroKlenz  2.  Swab periwound with skin prep, let dry  3.  Apply small piece of Aquacel Ag to open area  4.  Cover with Mepilex 4x4    Left heel:  Daily: Cleanse with bath wipes, then apply Sween 24 cream.  Apply Prevalon boots bilaterally when in bed.     Right great toe: Daily: cleanse thoroughly with MicroKlenz, pat dry.  Cover with PolyMem strip.  Notify WOC and MD if worsens or appears infected.     Right groin wound: BID and prn:   1.  Cleanse intact skin to groin area with John perineal lotion and dry cloths  2.  Cleanse the small open wound to inner right groin with MicroKlenz  3.  Apply small piece of Aquacel Ag along wound  4.  Ok to apply Interdry along groin  folds also PRN if skin moist or rashy, keep in a flat layer    Penile meatus: no wound cares needed; continue meticulous catheter and perineal cares and ensure catheter is secured appropriately, not pulling on meatus    Orders Updated  Recommended provider order: None, at this time  WO Nurse follow-up plan: weekly  Nursing to notify the Provider(s) and re-consult the WOC Nurse if wound(s) deteriorates or new skin concern.    Patient History  According to provider note(s):  Rahul Ruffin is a 41 year old male who presents with need for placement     MS  Total cares, was living in care facility in [place] but wants to be placed here. Just got off plane and came to ED for placement. Reports no acute issues. Labs normal.  Mom says patient usually gets care at [facility] and [place].  I cannot access any records from him through epic.    Objective Data  Containment of urine/stool: Incontinence Protocol, Brief and Indwelling catheter    Active Diet Order:  Orders Placed This Encounter      Mechanical/Dental Soft Diet    Output:   I/O last 3 completed shifts:  In: 480 [P.O.:480]  Out: 925 [Urine:925]    Risk Assessment:   Sensory Perception: 3-->slightly limited  Moisture: 3-->occasionally moist  Activity: 2-->chairfast  Mobility: 2-->very limited  Nutrition: 3-->adequate  Friction and Shear: 2-->potential problem  Nick Score: 15                          Labs:   No lab results found in last 7 days.    Invalid input(s): GLUCOMBO    Physical Exam  Skin inspection: focused coccyx, posterior thighs, lower legs, heels, penis    Wound Location:  Coccyx   Date of last photo:  6-9-21      Wound History: per report, pt with poor hygiene on admission.  Pt bed/chair bound, can help with turns, but still needs assist x 1-2.  Chronic payne in place but incontinent of formed soft stool.   Measurements (length x width x depth, in cm): scattered pink areas 0.5cm or less, no depth  Wound Base: pink fragile newly resurfaced  tissue; dry flaky peely skin  Palpation of the wound bed: normal   Periwound skin: intact, darker pigmentation to buttocks, dry and flaky  Color: normal to darker brown  Temperature: normal   Drainage: none  Odor: none   Pain: denies      Wound Location:  Posterior thighs  Date of last photo:  6-9-21, see above  Wound History: present on admission, likely from brief  Measurements (length x width x depth, in cm):  approx 2 x 0.5 x 0.1cm to left thigh, 0.5 x 1 x 0cm to right thigh  Wound Base: pink dry tissue  Palpation of the wound bed: normal   Periwound skin: intact. Medial thighs look slightly abraded this week  Color: normal and consistent with surrounding tissue  Temperature: normal   Drainage: none noted  Odor: none  Pain: minimal    Wound Location:  Right heel  Date of last photo:  6-9-21      Wound History: present on admission.  Pt can slightly move legs but cannot easily reposition them on his own.    Measurements (length x width x depth, in cm):  Approx 1 x 1 x 0.2cm open area and 4 x 5cm general healing area/ debrided prior blister  Wound Base: pink-red moist friable  Tunneling: N/A  Undermining: N/A  Palpation of the wound bed: normal   Periwound skin: pink newly resurfaced tissue, remnants of blistered tissue at edges  Color: pink  Temperature: normal   Drainage: moderate  Description of drainage: bloody  Odor: none  Pain: mild      Wound Location:  Left heel  Date of last photo:  6-9-21      Wound History: present on admission  Measurements (length x width x depth, in cm): approx 2 x 2cm intact  Wound Base: intact scarred/calloused firm scaly tissue, looks newly healed over  Tunneling: N/A  Undermining: N/A  Palpation of the wound bed: firm   Periwound skin: flaky, scabby, dry, peely  Color: normal and consistent with surrounding tissue  Temperature: normal   Drainage: none  Odor: none  Pain: denies      Wound Location:  Left proximal posterior/lateral calf  Date of last photo:  6-9-21      Wound  History: pt states is from minor trauma some years ago and area has had trouble staying healed  Measurements (length x width x depth, in cm):  approx 5 x 5cm, mostly intact, proximal superficial open area 1.2 x 1.2 x 0.1cm   Wound Base: mostly pink-purple-brown scar tissue with scant pale pink and red moist tissue  Palpation of the wound bed: normal   Periwound skin: dry/scaly  Color: normal and consistent with surrounding tissue  Temperature: normal   Drainage: scant  Description of drainage: serosanguinous  Odor: none  Pain: minimal    Wound Location:  Right great toe  Date of last photo:  6-9-21      Wound History: unclear, but seems that area had thick dried scabby adherent drainage previously that has now sloughed itself off; question if pt had ingrown toenail intervention  Measurements (length x width x depth, in cm): medial to nailbed approx 0.6 x 0.3 x 0.2cm   Wound Base: moist pinkish tissue  Tunneling: N/A  Undermining: N/A, not explored  Palpation of the wound bed: normal   Periwound skin: intact  Color: normal and consistent with surrounding tissue  Temperature: normal   Drainage: moderate  Description of drainage: serosanguinous and bloody  Odor: none  Pain: mild to moderate    Wound Location:  Right inner groin  Date of last photo:  6-9-21      Wound History: unclear; reported by nursing to WOC today  Measurements (length x width x depth, in cm):  approx 1.2 x 1.2 x 0.3cm   Wound Base: red moist ragged tissue  Tunneling: N/A  Undermining: N/A  Palpation of the wound bed: normal   Periwound skin: intact  Color: normal and consistent with surrounding tissue  Temperature: normal   Drainage: small  Description of drainage: bloody  Odor: none  Pain: mild      Interventions  Current support surface: Pulsate  Current off-loading measures: Pillows    Visual inspection of wound(s) completed  Wound Care: done per plan of care  Supplies: reviewed    Education provided: importance of repositioning, plan of care,  wound progress, Infection prevention , Moisture management, Hygiene and Off-loading pressure  Discussed plan of care with Patient and Nurse    Annette Dove RN, CWOCN

## 2021-06-09 NOTE — PLAN OF CARE
A&OX3, slightly forgetful. VSS. Assist of 2 with lift. Turn/repo q 2 hr. Chronic payne in place, and patient had 1 BM.Pain managed with oxycodon, tylenol and lyrica. Feeder with mechanical soft diet. Wounds on left calf, right heel and groin. Dressing CDI. Plans for discharge dependent on bank statements from the mother and placement.

## 2021-06-09 NOTE — PROGRESS NOTES
Obs goals:  -diagnostic tests and consults completed and resulted: met  -vital signs normal or at patient baseline: met  -infection is improving: met  -safe disposition plan has been identified: not met  Nurse to notify provider when observation goals have been met and patient is ready for discharge.    [id number]: Pt here with MS. ARMENTA&Ox4. Wound to L heel - pale & red, dressing changed by WOC. Wound to R groin - clean interdry applied. L calf dressing also changed by WOC. VSS. Mechanical soft diet, thin liquids. Takes pills without difficulty. Up with lift - T/R Q2. C/o moderate to severe feet pain, decreased with oxycodone. Helm patent. Pt scoring green on the Aggression Stop Light Tool.

## 2021-06-09 NOTE — PROGRESS NOTES
"[facility]  Medicine Progress Note - Hospitalist Service       Date of Admission:  5/27/2021  Assessment & Plan             Rahul Ruffin is a 41 year old male who presented with need for placement.    Principal problem:  Multiple sclerosis  Total cares, was living in care facility in [place] but wants to be placed here. Just got off plane and came to ED for placement. Reports no acute issues. Labs normal.  Mom says patient usually gets care at [facility] and [place]. Not able to access any records from him through epic.    Continue PTA medications, including Fingolimod, monitor patients for signs and symptoms of liver injury, such as unexplained vomiting, abdominal pain, fatigue, anorexia, or jaundice and/or dark urine, during treatment with Gilenya with periodic LFTs as recommended during and after treatment. LFTs normal.    Social work following to help with placement. Waiting to hear on MA status      Chronic indwelling payne    UA with heavy pyuria, likely chronic. No culture done.    Patient is afebrile with normal WBC count.     Continue Doxycycline (Mom states he is currently on abx for UTI)     Chronic foot swelling  Neuropathic pain  Present x 8 months.     Tylenol PRN    He is already on maximal dose of gabapentin given 3 times daily.  Divided the dose to 4 times daily dosing, but he reports ongoing, pain    Changed to Lyrica, Started 75 mg 3 times daily on 6/4, increased to 75/75/150 mg now    Nursing notes indicate patient had \"bilateral foot jerking.\"  He has been on a very low-dose of baclofen, increased to 10 mg 3 times daily    Chronic wounds  Upper L lateral calf and R heel, states baseline.     Wound cares     COVID-19 negative on admission     Diet: Mechanical/Dental Soft Diet  Room Service    DVT Prophylaxis: Pneumatic Compression Devices  Payne Catheter: in place, indication: Other (Comment)(chronic)  Code Status: Full Code         Disposition Plan   Expected " discharge: TBD, stable for discharge, recommended to care facility once MA pending and bed available at care facility.  Entered: Phuong Joaquin MD 06/09/2021, 1:43 PM     The patient's care was discussed with the Bedside Nurse and Patient.    Phuong Joaquin MD  Hospitalist Service  [facility]  Contact information available via [facility] Paging/Directory    ______________________________________________________________________    Interval History   No acute issues or concern reported by patient or RN.   Chronic bilateral leg/foot pain unchanged    Data reviewed today: I reviewed all medications, new labs and imaging results over the last 24 hours. I personally reviewed no images or EKG's today.    Physical Exam   Vital Signs: Temp: 97.5  F (36.4  C) Temp src: Oral BP: 109/62 Pulse: 75   Resp: 16 SpO2: 97 % O2 Device: None (Room air)    Weight: 0 lbs 0 oz    General: Alert awake, oriented.  HEENT: PERRLA EOMI. Lt strabismus present. Mucosa moist.   Lungs: Bilateral equal air entry. Clear to auscultation, normal work of breathing.   CVS: S1S2 regular, no tachycardia or murmur.   Abdomen: Soft, NT, ND. BS heard.  Neuro: alert awake, oriented, follows verbal commands appropriately. LUE 4+/5, Rt is 5/5, both legs are weak3+/5.   Skin: No rash.     Data   Recent Labs   Lab 06/05/21  0621      POTASSIUM 3.9   CHLORIDE 103   CO2 28   BUN 15   CR 0.79   ANIONGAP 5   SALINAS 9.3   GLC 77     No results found for this or any previous visit (from the past 24 hour(s)).  Medications       baclofen  10 mg Oral TID     doxycycline hyclate  100 mg Oral Daily     DULoxetine  30 mg Oral Daily     fingolimod  0.5 mg Oral Daily     furosemide  20 mg Oral Daily     lactulose  30 mL Oral Daily     oxyCODONE  5 mg Oral TID     potassium chloride  10 mEq Oral Daily     pregabalin  150 mg Oral At Bedtime     pregabalin  75 mg Oral BID

## 2021-06-10 PROCEDURE — 250N000013 HC RX MED GY IP 250 OP 250 PS 637: Performed by: INTERNAL MEDICINE

## 2021-06-10 PROCEDURE — 99225 PR SUBSEQUENT OBSERVATION CARE,LEVEL II: CPT | Performed by: HOSPITALIST

## 2021-06-10 PROCEDURE — G0378 HOSPITAL OBSERVATION PER HR: HCPCS

## 2021-06-10 PROCEDURE — 99207 PR CDG-MDM COMPONENT: MEETS MODERATE - UP CODED: CPT | Performed by: HOSPITALIST

## 2021-06-10 RX ORDER — OXYCODONE HYDROCHLORIDE 5 MG/1
5 TABLET ORAL EVERY 6 HOURS PRN
Status: DISCONTINUED | OUTPATIENT
Start: 2021-06-10 | End: 2021-06-11

## 2021-06-10 RX ADMIN — POTASSIUM CHLORIDE 10 MEQ: 750 TABLET, EXTENDED RELEASE ORAL at 08:23

## 2021-06-10 RX ADMIN — BACLOFEN 10 MG: 10 TABLET ORAL at 22:44

## 2021-06-10 RX ADMIN — PREGABALIN 75 MG: 25 CAPSULE ORAL at 08:23

## 2021-06-10 RX ADMIN — DULOXETINE HYDROCHLORIDE 30 MG: 30 CAPSULE, DELAYED RELEASE PELLETS ORAL at 08:23

## 2021-06-10 RX ADMIN — OXYCODONE HYDROCHLORIDE 5 MG: 5 TABLET ORAL at 16:06

## 2021-06-10 RX ADMIN — PREGABALIN 150 MG: 100 CAPSULE ORAL at 22:42

## 2021-06-10 RX ADMIN — PREGABALIN 75 MG: 25 CAPSULE ORAL at 16:06

## 2021-06-10 RX ADMIN — OXYCODONE HYDROCHLORIDE 5 MG: 5 TABLET ORAL at 08:23

## 2021-06-10 RX ADMIN — FUROSEMIDE 20 MG: 20 TABLET ORAL at 08:23

## 2021-06-10 RX ADMIN — OXYCODONE HYDROCHLORIDE 5 MG: 5 TABLET ORAL at 01:57

## 2021-06-10 RX ADMIN — ACETAMINOPHEN 650 MG: 325 TABLET, FILM COATED ORAL at 22:42

## 2021-06-10 RX ADMIN — FINGOLIMOD HCL 0.5 MG: 0.5 CAPSULE ORAL at 08:27

## 2021-06-10 RX ADMIN — BACLOFEN 10 MG: 10 TABLET ORAL at 08:23

## 2021-06-10 RX ADMIN — BACLOFEN 10 MG: 10 TABLET ORAL at 16:06

## 2021-06-10 RX ADMIN — DOXYCYCLINE 100 MG: 100 CAPSULE ORAL at 08:23

## 2021-06-10 RX ADMIN — OXYCODONE HYDROCHLORIDE 5 MG: 5 TABLET ORAL at 22:43

## 2021-06-10 RX ADMIN — LACTULOSE 30 ML: 10 SOLUTION ORAL at 08:23

## 2021-06-10 NOTE — PLAN OF CARE
Alert and oriented. VSS on RA. Pain managed with prn scheduled Oxycodone. Tolerating mech soft diet. Total assist with feeding. Helm patient. Wound cares to left calf and heel, right heel, right great toe, and coccyx all completed. Turn and repo. Up with lift. Discharge pending placement.

## 2021-06-10 NOTE — PLAN OF CARE
Bed/chair bound. Total care. A&Ox4, forgetful. VSS RA. Baseline tremor BUE&BLE. RLE foot/ankle pain managed w/prn oxycodone. Chronic payne. T/r q2. Boots on. Bilat groin redness, managed with barrier and interdry. Diet mech soft. Awaiting placement. SW following.

## 2021-06-10 NOTE — PROGRESS NOTES
Care Management Follow Up    Length of Stay (days): 0    Expected Discharge Date: 06/11/21     Concerns to be Addressed: discharge planning     Patient plan of care discussed at interdisciplinary rounds: Yes    Anticipated Discharge Disposition: Long Term Care     Anticipated Discharge Services:    Anticipated Discharge DME:      Patient/family educated on Medicare website which has current facility and service quality ratings: yes  Education Provided on the Discharge Plan:    Patient/Family in Agreement with the Plan: yes    Referrals Placed by CM/SW: Post Acute Facilities  Private pay costs discussed: Not applicable    Additional Information:  Writer spoke to financial counseling yesterday regarding what is needed to complete MA application. Writer told that 2 forms are needed to be signed by patient. First one to allow benefits in [place] to close and second AVS form to search for other accounts in the system.     Writer received forms via email today with instructions from Pito with  on where patient is to sign. Writer confirmed that she is working with patient's mother regarding 3 months of bank statements needed to process application. Writer informed that patient's mother has stated that she would prefer to work with the [facility] rather than financial counseling here. Writer requested that Pito reach out to the [facility] to see if patient's mother has been in contact or has provided requested information.    Writer met with patient for signature. Patient's hand was very unsteady and he would like to know if the form can be signed on his behalf. Call to Pito to ask this. Call placed to patient's mom and she reported that she has the bank statements, just received them yesterday. She confirmed the unit fax number and will be sending them shortly.     Writer informed that patient can sign, however it looks and will be accepted. Writer met with patient and he signed. The forms were scanned and emailed to Pito.  Writer spoke to patient's mom who confirmed that she spoke to Ariana Ortiz with [facility] and will send the bank statements to her directly. Writer will confirm that they have been received.       CORWIN Gutierrez

## 2021-06-10 NOTE — PROGRESS NOTES
Obs goals:  -diagnostic tests and consults completed and resulted: met  -vital signs normal or at patient baseline: met  -infection is improving: met  -safe disposition plan has been identified: not met  Nurse to notify provider when observation goals have been met and patient is ready for discharge.

## 2021-06-10 NOTE — PROVIDER NOTIFICATION
Observation goals PRIOR TO DISCHARGE      Comments: -diagnostic tests and consults completed and resulted: MET    -vital signs normal or at patient baseline :MET  -infection is improving: MET    -safe disposition plan has been identified: NOT MET. SW following for placement    Nurse to notify provider when observation goals have been met and patient is ready for discharge

## 2021-06-10 NOTE — PROGRESS NOTES
"[facility]  Medicine Progress Note - Hospitalist Service       Date of Admission:  5/27/2021  Assessment & Plan             Rahul Ruffin is a 41 year old male who presented with need for placement.    Principal problem:  Multiple sclerosis  Total cares, was living in care facility in [place] but wants to be placed here. Just got off plane and came to ED for placement. Reports no acute issues. Labs normal.  Mom says patient usually gets care at [facility] and [place]. Not able to access any records from him through epic.    Continue PTA medications, including Fingolimod, monitor patients for signs and symptoms of liver injury, such as unexplained vomiting, abdominal pain, fatigue, anorexia, or jaundice and/or dark urine, during treatment with Gilenya with periodic LFTs as recommended during and after treatment. LFTs normal.    Social work following to help with placement. Waiting to hear on MA status      Chronic indwelling payne    UA with heavy pyuria, likely chronic. No culture done.    Patient is afebrile with normal WBC count.     Continue Doxycycline (Mom states he is currently on abx for UTI)     Chronic foot swelling  Neuropathic pain  Present x 8 months.     Tylenol PRN    He is already on maximal dose of gabapentin given 3 times daily.  Divided the dose to 4 times daily dosing, but he reports ongoing, pain    Changed to Lyrica, Started 75 mg 3 times daily on 6/4, increased to 75/75/150 mg now    Nursing notes indicate patient had \"bilateral foot jerking.\"  He has been on a very low-dose of baclofen, increased to 10 mg 3 times daily    Minimize narcotic, appears neuropathic pain and not relieved by narcotic.    Chronic wounds  Upper L lateral calf and R heel, states baseline.     Wound cares     COVID-19 negative on admission     Diet: Mechanical/Dental Soft Diet  Room Service    DVT Prophylaxis: Pneumatic Compression Devices  Payne Catheter: in place, indication: " Retention  Code Status: Full Code         Disposition Plan   Expected discharge: TBD, stable for discharge, recommended to care facility once MA pending and bed available at care facility.  Entered: Phuong Joaquin MD 06/10/2021, 10:44 AM     The patient's care was discussed with the Bedside Nurse and Patient.    Phuong Joaquin MD  Hospitalist Service  [facility]  Contact information available via [facility] Paging/Directory    ______________________________________________________________________    Interval History   No acute issues or concern reported by patient or RN.   Chronic bilateral leg/foot pain unchanged and feels oxycodone is not making any difference though taking it regularly.    Data reviewed today: I reviewed all medications, new labs and imaging results over the last 24 hours. I personally reviewed no images or EKG's today.    Physical Exam   Vital Signs: Temp: 97.9  F (36.6  C) Temp src: Oral BP: 122/86 Pulse: 89   Resp: 18 SpO2: 97 % O2 Device: None (Room air)    Weight: 0 lbs 0 oz    General: Alert awake, oriented.  HEENT: PERRLA EOMI. Lt strabismus present. Mucosa moist.   Lungs: Bilateral equal air entry. Clear to auscultation, normal work of breathing.   CVS: S1S2 regular, no tachycardia or murmur.   Abdomen: Soft, NT, ND. BS heard.  Neuro: alert awake, oriented, follows verbal commands appropriately. LUE 4+/5, Rt is 5/5, both legs are weak3+/5.   Skin: No rash.     Data   Recent Labs   Lab 06/05/21  0621      POTASSIUM 3.9   CHLORIDE 103   CO2 28   BUN 15   CR 0.79   ANIONGAP 5   SALINAS 9.3   GLC 77     No results found for this or any previous visit (from the past 24 hour(s)).  Medications       baclofen  10 mg Oral TID     doxycycline hyclate  100 mg Oral Daily     DULoxetine  30 mg Oral Daily     fingolimod  0.5 mg Oral Daily     furosemide  20 mg Oral Daily     lactulose  30 mL Oral Daily     oxyCODONE  5 mg Oral TID     potassium chloride  10 mEq Oral Daily      pregabalin  150 mg Oral At Bedtime     pregabalin  75 mg Oral BID

## 2021-06-10 NOTE — PLAN OF CARE
A&Ox4, forgetful. VSS on RA. A2 T&R. Chronic payne, 1 BM this shift, incontinent. Tolerating mechanical soft diet, total feet. Bilateral foot/leg pain controlled with scheduled oxycodone and baclofen. Baseline tremors in hands. R groin, R heel, L calf, and R toe dressings CDI, pneumo boots in place. Plan pending placement.

## 2021-06-11 LAB
GLUCOSE BLDC GLUCOMTR-MCNC: 72 MG/DL (ref 70–99)
GLUCOSE BLDC GLUCOMTR-MCNC: 80 MG/DL (ref 70–99)
LABORATORY COMMENT REPORT: NORMAL
SARS-COV-2 RNA RESP QL NAA+PROBE: NEGATIVE
SPECIMEN SOURCE: NORMAL

## 2021-06-11 PROCEDURE — 99225 PR SUBSEQUENT OBSERVATION CARE,LEVEL II: CPT | Performed by: HOSPITALIST

## 2021-06-11 PROCEDURE — 250N000013 HC RX MED GY IP 250 OP 250 PS 637: Performed by: INTERNAL MEDICINE

## 2021-06-11 PROCEDURE — G0378 HOSPITAL OBSERVATION PER HR: HCPCS

## 2021-06-11 PROCEDURE — 99207 PR CDG-MDM COMPONENT: MEETS MODERATE - UP CODED: CPT | Performed by: HOSPITALIST

## 2021-06-11 PROCEDURE — 250N000013 HC RX MED GY IP 250 OP 250 PS 637: Performed by: HOSPITALIST

## 2021-06-11 PROCEDURE — 999N001017 HC STATISTIC GLUCOSE BY METER IP

## 2021-06-11 PROCEDURE — 87635 SARS-COV-2 COVID-19 AMP PRB: CPT | Performed by: HOSPITALIST

## 2021-06-11 RX ORDER — OXYCODONE HYDROCHLORIDE 5 MG/1
5 TABLET ORAL 2 TIMES DAILY PRN
Status: DISCONTINUED | OUTPATIENT
Start: 2021-06-11 | End: 2021-06-21 | Stop reason: HOSPADM

## 2021-06-11 RX ADMIN — OXYCODONE HYDROCHLORIDE 5 MG: 5 TABLET ORAL at 16:13

## 2021-06-11 RX ADMIN — LACTULOSE 30 ML: 10 SOLUTION ORAL at 08:13

## 2021-06-11 RX ADMIN — DULOXETINE HYDROCHLORIDE 30 MG: 30 CAPSULE, DELAYED RELEASE PELLETS ORAL at 08:13

## 2021-06-11 RX ADMIN — ACETAMINOPHEN 650 MG: 325 TABLET, FILM COATED ORAL at 14:36

## 2021-06-11 RX ADMIN — OXYCODONE HYDROCHLORIDE 5 MG: 5 TABLET ORAL at 19:12

## 2021-06-11 RX ADMIN — BACLOFEN 10 MG: 10 TABLET ORAL at 16:12

## 2021-06-11 RX ADMIN — OXYCODONE HYDROCHLORIDE 5 MG: 5 TABLET ORAL at 22:09

## 2021-06-11 RX ADMIN — POTASSIUM CHLORIDE 10 MEQ: 750 TABLET, EXTENDED RELEASE ORAL at 08:14

## 2021-06-11 RX ADMIN — OXYCODONE HYDROCHLORIDE 5 MG: 5 TABLET ORAL at 08:13

## 2021-06-11 RX ADMIN — ACETAMINOPHEN 650 MG: 325 TABLET, FILM COATED ORAL at 09:55

## 2021-06-11 RX ADMIN — PREGABALIN 150 MG: 100 CAPSULE ORAL at 22:09

## 2021-06-11 RX ADMIN — FINGOLIMOD HCL 0.5 MG: 0.5 CAPSULE ORAL at 08:37

## 2021-06-11 RX ADMIN — FUROSEMIDE 20 MG: 20 TABLET ORAL at 08:13

## 2021-06-11 RX ADMIN — BACLOFEN 10 MG: 10 TABLET ORAL at 08:13

## 2021-06-11 RX ADMIN — DOXYCYCLINE 100 MG: 100 CAPSULE ORAL at 08:14

## 2021-06-11 RX ADMIN — BACLOFEN 10 MG: 10 TABLET ORAL at 22:09

## 2021-06-11 RX ADMIN — PREGABALIN 75 MG: 25 CAPSULE ORAL at 08:13

## 2021-06-11 RX ADMIN — PREGABALIN 75 MG: 25 CAPSULE ORAL at 16:12

## 2021-06-11 NOTE — PLAN OF CARE
A&Ox4. VSS on RA. Mechanical soft diet. Turned and repositioned Q2 for skin integrity. Wound care completed. Lung sounds clear/diminished. Bowel sounds active. BM x2 today, incontinent. Chronic payne, patent. Discharge pending placement.

## 2021-06-11 NOTE — PROGRESS NOTES
[facility]  Medicine Progress Note - Hospitalist Service       Date of Admission:  5/27/2021  Assessment & Plan             Rahul Ruffin is a 41 year old male who presented with need for placement.    Principal problem:  Multiple sclerosis  Total cares, was living in care facility in [place] but wants to be placed here. Just got off plane and came to ED for placement. Reports no acute issues. Labs normal.  Mom says patient usually gets care at [facility] and [place]. Not able to access any records from him through epic.    Continue PTA medications, including Fingolimod, monitor patients for signs and symptoms of liver injury, such as unexplained vomiting, abdominal pain, fatigue, anorexia, or jaundice and/or dark urine, during treatment with Gilenya with periodic LFTs as recommended during and after treatment. LFTs normal.    Social work following to assist with placement.      Chronic indwelling payne    UA with heavy pyuria, likely chronic. No culture done.    Patient is afebrile with normal WBC count.     Continue Doxycycline (Mom states he is currently on abx for UTI)     Chronic foot swelling  Neuropathic pain  Present x 8 months.     Tylenol PRN    He is already on maximal dose of gabapentin given 3 times daily.  Divided the dose to 4 times daily dosing, but he reports ongoing, pain    Changed to Lyrica, Started 75 mg 3 times daily on 6/4, increased to 75/75/150 mg now    Nursing notes had indicate patient had bilateral foot jerking and so PTA baclofen increased to 10 mg 3 times daily    Minimize narcotic, appears neuropathic pain and not relieved by narcotic.    Chronic wounds  Upper L lateral calf and R heel, states baseline.     Wound cares     COVID-19 negative on admission     Diet: Mechanical/Dental Soft Diet  Room Service    DVT Prophylaxis: Pneumatic Compression Devices  Payne Catheter: in place, indication: Retention  Code Status: Full Code         Disposition Plan    Expected discharge: TBD, stable for discharge, recommended to care facility once MA pending, his financial issues pertinent to this addressed and bed available at care facility.  Entered: Phuong Joaquin MD 06/11/2021, 10:56 AM     The patient's care was discussed with the Bedside Nurse and Patient.    Phuong Joaquin MD  Hospitalist Service  [facility]  Contact information available via [facility] Paging/Directory    ______________________________________________________________________    Interval History   Unchanged chronic bilateral feet pain, no acute issues    Data reviewed today: I reviewed all medications, new labs and imaging results over the last 24 hours. I personally reviewed no images or EKG's today.    Physical Exam   Vital Signs: Temp: 98  F (36.7  C) Temp src: Oral BP: 111/76 Pulse: 69   Resp: 18 SpO2: 97 % O2 Device: None (Room air)    Weight: 0 lbs 0 oz    General: Alert awake, oriented.  HEENT: PERRLA EOMI. Lt strabismus present. Mucosa moist.   Lungs: Bilateral equal air entry. Clear to auscultation, normal work of breathing.   CVS: S1S2 regular, no tachycardia or murmur.   Abdomen: Soft, NT, ND. BS heard.  Neuro: alert awake, oriented, follows verbal commands appropriately. LUE 4+/5, Rt is 5/5, both legs are weak3+/5.   Skin: No rash.     Data   Recent Labs   Lab 06/05/21  0621      POTASSIUM 3.9   CHLORIDE 103   CO2 28   BUN 15   CR 0.79   ANIONGAP 5   SALINAS 9.3   GLC 77     No results found for this or any previous visit (from the past 24 hour(s)).  Medications       baclofen  10 mg Oral TID     doxycycline hyclate  100 mg Oral Daily     DULoxetine  30 mg Oral Daily     fingolimod  0.5 mg Oral Daily     furosemide  20 mg Oral Daily     lactulose  30 mL Oral Daily     oxyCODONE  5 mg Oral TID     potassium chloride  10 mEq Oral Daily     pregabalin  150 mg Oral At Bedtime     pregabalin  75 mg Oral BID

## 2021-06-11 NOTE — PROGRESS NOTES
Obs goals:  Diagnostic tests and consults completed and resulted: MET  Vital signs normal or at patient baseline: Met  Infection is improving MET  Safe disposition plan has been identified NOT MET, in progress

## 2021-06-11 NOTE — PLAN OF CARE
91535 to 0730 6/10/21 Alert and oriented. VSS on RA. Pain managed with prn scheduled Oxycodone. Tolerating [facility] soft diet. Total assist with feeding. Helm patient.  Dressings  to left calf and heel, right heel, right great toe, and coccyx all  C/;D/I turn and repo. Up with lift. Discharge pending placement.

## 2021-06-12 PROCEDURE — 250N000013 HC RX MED GY IP 250 OP 250 PS 637: Performed by: INTERNAL MEDICINE

## 2021-06-12 PROCEDURE — 250N000013 HC RX MED GY IP 250 OP 250 PS 637: Performed by: HOSPITALIST

## 2021-06-12 PROCEDURE — 99224 PR SUBSEQUENT OBSERVATION CARE,LEVEL I: CPT | Performed by: HOSPITALIST

## 2021-06-12 PROCEDURE — G0378 HOSPITAL OBSERVATION PER HR: HCPCS

## 2021-06-12 RX ORDER — DOXYCYCLINE 100 MG/1
100 CAPSULE ORAL DAILY
Status: DISCONTINUED | OUTPATIENT
Start: 2021-06-13 | End: 2021-06-21 | Stop reason: HOSPADM

## 2021-06-12 RX ADMIN — BACLOFEN 10 MG: 10 TABLET ORAL at 09:14

## 2021-06-12 RX ADMIN — DULOXETINE HYDROCHLORIDE 30 MG: 30 CAPSULE, DELAYED RELEASE PELLETS ORAL at 09:13

## 2021-06-12 RX ADMIN — OXYCODONE HYDROCHLORIDE 5 MG: 5 TABLET ORAL at 09:14

## 2021-06-12 RX ADMIN — PREGABALIN 75 MG: 25 CAPSULE ORAL at 09:13

## 2021-06-12 RX ADMIN — ACETAMINOPHEN 650 MG: 325 TABLET, FILM COATED ORAL at 15:56

## 2021-06-12 RX ADMIN — POTASSIUM CHLORIDE 10 MEQ: 750 TABLET, EXTENDED RELEASE ORAL at 09:13

## 2021-06-12 RX ADMIN — PREGABALIN 150 MG: 100 CAPSULE ORAL at 21:58

## 2021-06-12 RX ADMIN — PREGABALIN 75 MG: 25 CAPSULE ORAL at 15:56

## 2021-06-12 RX ADMIN — LACTULOSE 30 ML: 10 SOLUTION ORAL at 09:14

## 2021-06-12 RX ADMIN — BACLOFEN 10 MG: 10 TABLET ORAL at 15:56

## 2021-06-12 RX ADMIN — FUROSEMIDE 20 MG: 20 TABLET ORAL at 09:14

## 2021-06-12 RX ADMIN — DOXYCYCLINE 100 MG: 100 CAPSULE ORAL at 09:14

## 2021-06-12 RX ADMIN — OXYCODONE HYDROCHLORIDE 5 MG: 5 TABLET ORAL at 21:58

## 2021-06-12 RX ADMIN — OXYCODONE HYDROCHLORIDE 5 MG: 5 TABLET ORAL at 09:12

## 2021-06-12 RX ADMIN — BACLOFEN 10 MG: 10 TABLET ORAL at 21:58

## 2021-06-12 RX ADMIN — ACETAMINOPHEN 650 MG: 325 TABLET, FILM COATED ORAL at 00:14

## 2021-06-12 RX ADMIN — FINGOLIMOD HCL 0.5 MG: 0.5 CAPSULE ORAL at 09:24

## 2021-06-12 RX ADMIN — OXYCODONE HYDROCHLORIDE 5 MG: 5 TABLET ORAL at 15:56

## 2021-06-12 ASSESSMENT — MIFFLIN-ST. JEOR: SCORE: 1596.21

## 2021-06-12 NOTE — PROGRESS NOTES
[facility]  Medicine Progress Note - Hospitalist Service       Date of Admission:  5/27/2021  Assessment & Plan             Rahul Ruffin is a 41 year old male who presented with need for placement.    Principal problem:  Multiple sclerosis  Total cares, was living in care facility in [place] but wants to be placed here. Just got off plane and came to ED for placement. Reports no acute issues. Labs normal.  Mom says patient usually gets care at [facility] and [place]. Not able to access any records from him through epic.    Continue PTA medications, including Fingolimod, monitor patients for signs and symptoms of liver injury, such as unexplained vomiting, abdominal pain, fatigue, anorexia, or jaundice and/or dark urine, during treatment with Gilenya with periodic LFTs as recommended during and after treatment. LFTs normal.    Social work following to assist with placement.      Chronic indwelling payne    UA with heavy pyuria, likely chronic. No culture done.    Patient is afebrile with normal WBC count.     He is on doxycyline (per chart review by RN at his care facility in [place], Ph [phone]), it is being used for Acne, so continue.      Chronic foot swelling  Neuropathic pain  Present x 8 months.     Tylenol PRN    He is already on maximal dose of gabapentin given 3 times daily.  Divided the dose to 4 times daily dosing, but he reports ongoing, pain    Changed to Lyrica, Started 75 mg 3 times daily on 6/4, increased to 75/75/150 mg now    Nursing notes had indicate patient had bilateral foot jerking and so PTA baclofen increased to 10 mg 3 times daily    Minimize narcotic, appears neuropathic pain and not relieved by narcotic.    Chronic wounds  Upper L lateral calf and R heel, states baseline.     Wound cares     COVID-19 negative on admission     Diet: Mechanical/Dental Soft Diet  Room Service    DVT Prophylaxis: Pneumatic Compression Devices  Payne Catheter: in place,  indication: Retention  Code Status: Full Code         Disposition Plan   Expected discharge: TBD, stable for discharge, recommended to care facility once MA pending, his financial issues pertinent to this addressed and bed available at care facility.  Entered: Phuong Joaquin MD 06/12/2021, 10:24 AM     The patient's care was discussed with the Bedside Nurse and Patient.    Phuong Joaquin MD  Hospitalist Service  [facility]  Contact information available via [facility] Paging/Directory    ______________________________________________________________________    Interval History   No acute issues. Awaiting placement    Data reviewed today: I reviewed all medications, new labs and imaging results over the last 24 hours. I personally reviewed no images or EKG's today.    Physical Exam   Vital Signs: Temp: 97.2  F (36.2  C) Temp src: Oral BP: (!) 131/90 Pulse: 64   Resp: 12 SpO2: 99 % O2 Device: None (Room air)    Weight: 152 lbs 4.8 oz    General: Alert awake, oriented. Tremulous at times.   HEENT: PERRLA EOMI. Lt strabismus present. Mucosa moist.   Lungs: Bilateral equal air entry. Clear to auscultation, normal work of breathing.   CVS: S1S2 regular, no tachycardia or murmur.   Abdomen: Soft, NT, ND. BS heard.  Neuro: alert awake, oriented, follows verbal commands appropriately. LUE 4+/5, Rt is 5/5, both legs are weak3+/5.   Skin: No rash.     Data   No lab results found in last 7 days.  No results found for this or any previous visit (from the past 24 hour(s)).  Medications       baclofen  10 mg Oral TID     doxycycline hyclate  100 mg Oral Daily     DULoxetine  30 mg Oral Daily     fingolimod  0.5 mg Oral Daily     furosemide  20 mg Oral Daily     lactulose  30 mL Oral Daily     oxyCODONE  5 mg Oral TID     potassium chloride  10 mEq Oral Daily     pregabalin  150 mg Oral At Bedtime     pregabalin  75 mg Oral BID

## 2021-06-12 NOTE — PROGRESS NOTES
Observation Goals:   ~diagnostic tests and consults completed and resulted: MET  ~vital signs normal or at patient baseline:MET  ~infection is improving: MET  ~safe disposition plan has been identified: NOT MET. SW following for placement and insurance pending.

## 2021-06-12 NOTE — PLAN OF CARE
Patient is A&O, forgetful @ times. Up with the mechanical lift, denies chest pain or SOB. Total feed. Complained of pain on bilateral foot, managed with scheduled oxycodone and PRN oxycodone x1. Helm patent with adequate output. Turn and repo. Discharging pending placement. Will continue to monitor.

## 2021-06-12 NOTE — PLAN OF CARE
A/O. VSS. Appetite good/ needs to be fed. Total care. Turned and positioned. Has tremors valentina. Legs. Wound care given per instructions in orders. Volodymyr patent.

## 2021-06-12 NOTE — PLAN OF CARE
Reason for admission: Admitted on 05/27/2021    Date/Time: June 12, 2021 1:04 AM   Cognitive/Mentation: A&Ox4, forgetful @ times  Neuros/CMS:   VS: B/P: 117/74, T: 97.8, P: 67, R: 16     Cardiovascular/Telemetry: NA  Respiratory: LS diminished clear bilaterally    GI:BS+, Flatus - No BM this shift. Incontinent   : Payne patent with adequate output.  Pain: Managed with scheduled oxycodone and PRN oxycodone/Tylenol    Drains: NA  Skin: Upper Left lateral calf  and heel, right heel, right great toe, and coccyx.   Activity: Up with the mechanical lift, wheelchair bound baseline. Turn and Reposition every 2 hours.  Diet: Mechanical/Dental soft-total feed   Discharge: Pending placement and MA pending    History:Multiple sclerosis,Chronic indwelling payne,  Neuropathic pain, Chronic wounds    End of shift summary: Turned and repositioned Q2 for skin integrity.       Observation Goals:   ~diagnostic tests and consults completed and resulted: MET  ~vital signs normal or at patient baseline:MET  ~infection is improving: MET  ~safe disposition plan has been identified: NOT MET. SW following for placement and insurance pending.

## 2021-06-13 PROCEDURE — 250N000013 HC RX MED GY IP 250 OP 250 PS 637: Performed by: INTERNAL MEDICINE

## 2021-06-13 PROCEDURE — 99224 PR SUBSEQUENT OBSERVATION CARE,LEVEL I: CPT | Performed by: HOSPITALIST

## 2021-06-13 PROCEDURE — G0378 HOSPITAL OBSERVATION PER HR: HCPCS

## 2021-06-13 PROCEDURE — 250N000013 HC RX MED GY IP 250 OP 250 PS 637: Performed by: HOSPITALIST

## 2021-06-13 RX ADMIN — OXYCODONE HYDROCHLORIDE 5 MG: 5 TABLET ORAL at 21:44

## 2021-06-13 RX ADMIN — PREGABALIN 75 MG: 25 CAPSULE ORAL at 08:21

## 2021-06-13 RX ADMIN — BACLOFEN 10 MG: 10 TABLET ORAL at 08:21

## 2021-06-13 RX ADMIN — DULOXETINE HYDROCHLORIDE 30 MG: 30 CAPSULE, DELAYED RELEASE PELLETS ORAL at 08:20

## 2021-06-13 RX ADMIN — OXYCODONE HYDROCHLORIDE 5 MG: 5 TABLET ORAL at 08:21

## 2021-06-13 RX ADMIN — FINGOLIMOD HCL 0.5 MG: 0.5 CAPSULE ORAL at 08:56

## 2021-06-13 RX ADMIN — PREGABALIN 150 MG: 100 CAPSULE ORAL at 21:45

## 2021-06-13 RX ADMIN — LACTULOSE 30 ML: 10 SOLUTION ORAL at 08:20

## 2021-06-13 RX ADMIN — ACETAMINOPHEN 650 MG: 325 TABLET, FILM COATED ORAL at 15:11

## 2021-06-13 RX ADMIN — BACLOFEN 10 MG: 10 TABLET ORAL at 21:44

## 2021-06-13 RX ADMIN — FUROSEMIDE 20 MG: 20 TABLET ORAL at 08:21

## 2021-06-13 RX ADMIN — OXYCODONE HYDROCHLORIDE 5 MG: 5 TABLET ORAL at 15:59

## 2021-06-13 RX ADMIN — POTASSIUM CHLORIDE 10 MEQ: 750 TABLET, EXTENDED RELEASE ORAL at 08:20

## 2021-06-13 RX ADMIN — DOXYCYCLINE 100 MG: 100 CAPSULE ORAL at 08:21

## 2021-06-13 RX ADMIN — ACETAMINOPHEN 650 MG: 325 TABLET, FILM COATED ORAL at 10:46

## 2021-06-13 RX ADMIN — OXYCODONE HYDROCHLORIDE 5 MG: 5 TABLET ORAL at 05:58

## 2021-06-13 RX ADMIN — PREGABALIN 75 MG: 25 CAPSULE ORAL at 16:00

## 2021-06-13 RX ADMIN — BACLOFEN 10 MG: 10 TABLET ORAL at 15:59

## 2021-06-13 NOTE — PROGRESS NOTES
Observation goals PRIOR TO DISCHARGE     Comments: -diagnostic tests and consults completed and resulted   -vital signs normal or at patient baseline -met  -infection is improving -met  -safe disposition plan has been identified -not met  Nurse to notify provider when observation goals have been met and patient is ready for discharge

## 2021-06-13 NOTE — PROGRESS NOTES
No sore (open or possible) noted to patients thighs, coccyx, periarea. Left heel well healed.  On and off confusion continues.no mood swings. Note left for MD. ?if progression of MS. Has trouble with vision. No difficulty swallowing. Incontinent of bowel- has payne for urine. Unable to help turn to sides. Spasms of tyrese prevent ability to eat. Spasticity of legs causes pain.

## 2021-06-13 NOTE — PLAN OF CARE
Reason for admission: MS- placement  Date/Time: 6/13/21 [id number]  Cognitive/Mentation: A&Ox4, forgetful @ times  Cardiovascular/Telemetry: NA  GI:BS+, Flatus - Incontinent -  : chronic Payne -adequate UO-payne cares done this morning and fidel area  Pain: Managed with scheduled oxycodone and PRN oxycodone/Tylenol 10/10 this morning, 5/10 this afternoon, spastic bilateral LE's.  Skin: Upper Left lateral calf  and heel, right heel, right great toe, and coccyx. Dressing changed 6/13/21 1500 on bilateral LE's  Activity: Up with the mechanical lift, wheelchair bound baseline. Turn and Reposition every 2 hours, not OOB this shift.  Diet: Mechanical/Dental soft-total feed- fair appetite  Discharge: Pending placement and MA pending

## 2021-06-13 NOTE — PLAN OF CARE
Turned q2h. Patient tolerates well but still has spasticity to bilateral legs L>R. No open areas to thighs or buttocks. Healed left heel ulcer intact. Dressings to right toe, lateral left calf and heel intact. Pulsate mattress on. Bilateral boots on feet. Total feeder. Helm intact. Incontinent small amount stool. Pleasant and cooperative.

## 2021-06-13 NOTE — PROGRESS NOTES
[facility]  Medicine Progress Note - Hospitalist Service       Date of Admission:  5/27/2021  Assessment & Plan             Rahul Ruffin is a 41 year old male who presented with need for placement.    Principal problem:  Multiple sclerosis  Total cares, was living in care facility in [place] but wants to be placed here. Just got off plane and came to ED for placement. Reports no acute issues. Labs normal.  Mom says patient usually gets care at [facility] and [place]. Not able to access any records from him through epic.    Continue PTA medications, including Fingolimod, monitor patients for signs and symptoms of liver injury, such as unexplained vomiting, abdominal pain, fatigue, anorexia, or jaundice and/or dark urine, during treatment with Gilenya with periodic LFTs as recommended during and after treatment. LFTs normal.    Social work following to assist with placement.      Chronic indwelling payne    UA with heavy pyuria, likely chronic. No culture done.    Patient is afebrile with normal WBC count.     He is on doxycyline (per chart review by RN at his care facility in [place], Ph [phone]), it is being used for Acne, so continue.      Chronic foot swelling  Neuropathic pain  Present x 8 months.     Tylenol PRN    He is already on maximal dose of gabapentin given 3 times daily.  Divided the dose to 4 times daily dosing, but he reports ongoing, pain    Changed to Lyrica, Started 75 mg 3 times daily on 6/4, increased to 75/75/150 mg now    Nursing notes had indicate patient had bilateral foot jerking and so PTA baclofen increased to 10 mg 3 times daily    Minimize narcotic, appears neuropathic pain and not relieved by narcotic.    Chronic wounds  Upper L lateral calf and R heel, states baseline.     Wound cares     COVID-19 negative on admission     Diet: Mechanical/Dental Soft Diet  Room Service    DVT Prophylaxis: Pneumatic Compression Devices  Payne Catheter: in place,  indication: Neurogenic Bladder  Code Status: Full Code         Disposition Plan   Expected discharge: TBD, stable for discharge, recommended to care facility once MA pending, his financial issues pertinent to this addressed and bed available at care facility.  Entered: Phuong Joaquin MD 06/13/2021, 1:22 PM     The patient's care was discussed with the Bedside Nurse and Patient.    Phuong Joaquin MD  Hospitalist Service  [facility]  Contact information available via [facility] Paging/Directory    ______________________________________________________________________    Interval History   No acute issues. Awaiting placement    Data reviewed today: I reviewed all medications, new labs and imaging results over the last 24 hours. I personally reviewed no images or EKG's today.    Physical Exam   Vital Signs: Temp: 97.7  F (36.5  C) Temp src: Oral BP: 122/85 Pulse: 67   Resp: 18 SpO2: 97 % O2 Device: None (Room air)    Weight: 152 lbs 4.8 oz    General: Alert awake, oriented. Tremulous at times.   HEENT: PERRLA EOMI. Lt strabismus present. Mucosa moist.   Lungs: Bilateral equal air entry. Clear to auscultation, normal work of breathing.   CVS: S1S2 regular, no tachycardia or murmur.   Abdomen: Soft, NT, ND. BS heard.  Neuro: alert awake, oriented, follows verbal commands appropriately. LUE 4+/5, Rt is 5/5, both legs are weak3+/5.   Skin: No rash.     Data   No lab results found in last 7 days.  No results found for this or any previous visit (from the past 24 hour(s)).  Medications       baclofen  10 mg Oral TID     doxycycline hyclate  100 mg Oral Daily     DULoxetine  30 mg Oral Daily     fingolimod  0.5 mg Oral Daily     furosemide  20 mg Oral Daily     lactulose  30 mL Oral Daily     oxyCODONE  5 mg Oral TID     potassium chloride  10 mEq Oral Daily     pregabalin  150 mg Oral At Bedtime     pregabalin  75 mg Oral BID

## 2021-06-14 PROCEDURE — 99225 PR SUBSEQUENT OBSERVATION CARE,LEVEL II: CPT | Performed by: HOSPITALIST

## 2021-06-14 PROCEDURE — 250N000013 HC RX MED GY IP 250 OP 250 PS 637: Performed by: HOSPITALIST

## 2021-06-14 PROCEDURE — 250N000013 HC RX MED GY IP 250 OP 250 PS 637: Performed by: INTERNAL MEDICINE

## 2021-06-14 PROCEDURE — 99207 PR CDG-CODE CATEGORY CHANGED: CPT | Performed by: HOSPITALIST

## 2021-06-14 PROCEDURE — G0378 HOSPITAL OBSERVATION PER HR: HCPCS

## 2021-06-14 RX ORDER — SENNOSIDES 8.6 MG
1 TABLET ORAL AT BEDTIME
Status: DISCONTINUED | OUTPATIENT
Start: 2021-06-14 | End: 2021-06-21 | Stop reason: HOSPADM

## 2021-06-14 RX ORDER — PREGABALIN 25 MG/1
25 CAPSULE ORAL ONCE
Status: DISCONTINUED | OUTPATIENT
Start: 2021-06-14 | End: 2021-06-16 | Stop reason: CLARIF

## 2021-06-14 RX ORDER — PREGABALIN 100 MG/1
100 CAPSULE ORAL 2 TIMES DAILY
Status: DISCONTINUED | OUTPATIENT
Start: 2021-06-15 | End: 2021-06-16

## 2021-06-14 RX ADMIN — FINGOLIMOD HCL 0.5 MG: 0.5 CAPSULE ORAL at 11:18

## 2021-06-14 RX ADMIN — OXYCODONE HYDROCHLORIDE 5 MG: 5 TABLET ORAL at 09:37

## 2021-06-14 RX ADMIN — OXYCODONE HYDROCHLORIDE 5 MG: 5 TABLET ORAL at 19:53

## 2021-06-14 RX ADMIN — BACLOFEN 10 MG: 10 TABLET ORAL at 16:59

## 2021-06-14 RX ADMIN — BACLOFEN 10 MG: 10 TABLET ORAL at 22:04

## 2021-06-14 RX ADMIN — PREGABALIN 75 MG: 25 CAPSULE ORAL at 16:58

## 2021-06-14 RX ADMIN — PREGABALIN 150 MG: 100 CAPSULE ORAL at 22:03

## 2021-06-14 RX ADMIN — BACLOFEN 10 MG: 10 TABLET ORAL at 09:38

## 2021-06-14 RX ADMIN — LACTULOSE 30 ML: 10 SOLUTION ORAL at 09:37

## 2021-06-14 RX ADMIN — DOXYCYCLINE 100 MG: 100 CAPSULE ORAL at 09:37

## 2021-06-14 RX ADMIN — PREGABALIN 75 MG: 25 CAPSULE ORAL at 09:38

## 2021-06-14 RX ADMIN — ACETAMINOPHEN 650 MG: 325 TABLET, FILM COATED ORAL at 18:42

## 2021-06-14 RX ADMIN — FUROSEMIDE 20 MG: 20 TABLET ORAL at 09:38

## 2021-06-14 RX ADMIN — OXYCODONE HYDROCHLORIDE 5 MG: 5 TABLET ORAL at 22:04

## 2021-06-14 RX ADMIN — POTASSIUM CHLORIDE 10 MEQ: 750 TABLET, EXTENDED RELEASE ORAL at 09:38

## 2021-06-14 RX ADMIN — ACETAMINOPHEN 650 MG: 325 TABLET, FILM COATED ORAL at 00:10

## 2021-06-14 RX ADMIN — STANDARDIZED SENNA CONCENTRATE 1 TABLET: 8.6 TABLET ORAL at 22:04

## 2021-06-14 RX ADMIN — OXYCODONE HYDROCHLORIDE 5 MG: 5 TABLET ORAL at 16:59

## 2021-06-14 RX ADMIN — ACETAMINOPHEN 650 MG: 325 TABLET, FILM COATED ORAL at 05:36

## 2021-06-14 RX ADMIN — DULOXETINE HYDROCHLORIDE 30 MG: 30 CAPSULE, DELAYED RELEASE PELLETS ORAL at 09:38

## 2021-06-14 NOTE — PROGRESS NOTES
Care Management Follow Up    Length of Stay (days): 0    Expected Discharge Date: 06/11/21     Concerns to be Addressed: discharge planning     Patient plan of care discussed at interdisciplinary rounds: Yes    Anticipated Discharge Disposition: Long Term Care     Anticipated Discharge Services:    Anticipated Discharge DME:      Patient/family educated on Medicare website which has current facility and service quality ratings: yes  Education Provided on the Discharge Plan:    Patient/Family in Agreement with the Plan: yes    Referrals Placed by CM/SW: Post Acute Facilities  Private pay costs discussed: Not applicable    Additional Information:  Message sent to Pito with [employer] regarding status of MA tirso. Writer noted that patient's mother reported that bank statements went to [facility]. Writer asked for confirmation.      CORWIN Gutierrez

## 2021-06-14 NOTE — PLAN OF CARE
Pt A/O x4, forgetful at times. VSS, repo q2. Dressings CDI. Feet elevated with protective boots, pulsate mattress. PRN tylenol given for pain, chronic payne in place, I/O adequate. Dressings CDI on bilateral LE. Continue to monitor.

## 2021-06-14 NOTE — PLAN OF CARE
A&O, calm and cooperative, VSS on RA, Oxycodone and PRN Tylenol for BLE pain,Chronic payne for retention, incontinent of stool, 1 small BM during the shift, lift in transfers, wheel chair bound baseline, wound care orders, mechanical dental soft diet, tolerating, needs assistance in feeding, discharge pending placement and insurance concerns.

## 2021-06-14 NOTE — PLAN OF CARE
Turned q2h with 2 assist. States vision has lost acuity for small print, but otherwise able to see. Pulsate mattress on bed. Numbness and tingling to all extremities wnl for patient. Small open pimple to buttocks otherwise intact. Feeder- less appetite tonight than usual. Helm intact. Anticipating transfer when MA approved.

## 2021-06-15 LAB
ANION GAP SERPL CALCULATED.3IONS-SCNC: 4 MMOL/L (ref 3–14)
BUN SERPL-MCNC: 12 MG/DL (ref 7–30)
CALCIUM SERPL-MCNC: 9.4 MG/DL (ref 8.5–10.1)
CHLORIDE SERPL-SCNC: 104 MMOL/L (ref 94–109)
CO2 SERPL-SCNC: 27 MMOL/L (ref 20–32)
CREAT SERPL-MCNC: 0.8 MG/DL (ref 0.66–1.25)
GFR SERPL CREATININE-BSD FRML MDRD: >90 ML/MIN/{1.73_M2}
GLUCOSE SERPL-MCNC: 82 MG/DL (ref 70–99)
POTASSIUM SERPL-SCNC: 4 MMOL/L (ref 3.4–5.3)
SODIUM SERPL-SCNC: 135 MMOL/L (ref 133–144)

## 2021-06-15 PROCEDURE — 99225 PR SUBSEQUENT OBSERVATION CARE,LEVEL II: CPT | Performed by: INTERNAL MEDICINE

## 2021-06-15 PROCEDURE — 250N000013 HC RX MED GY IP 250 OP 250 PS 637: Performed by: INTERNAL MEDICINE

## 2021-06-15 PROCEDURE — G0463 HOSPITAL OUTPT CLINIC VISIT: HCPCS

## 2021-06-15 PROCEDURE — 36415 COLL VENOUS BLD VENIPUNCTURE: CPT | Performed by: HOSPITALIST

## 2021-06-15 PROCEDURE — 99207 PR CDG-CODE CATEGORY CHANGED: CPT | Performed by: INTERNAL MEDICINE

## 2021-06-15 PROCEDURE — 250N000013 HC RX MED GY IP 250 OP 250 PS 637: Performed by: HOSPITALIST

## 2021-06-15 PROCEDURE — 80048 BASIC METABOLIC PNL TOTAL CA: CPT | Performed by: HOSPITALIST

## 2021-06-15 PROCEDURE — G0378 HOSPITAL OBSERVATION PER HR: HCPCS

## 2021-06-15 RX ADMIN — ACETAMINOPHEN 650 MG: 325 TABLET, FILM COATED ORAL at 06:31

## 2021-06-15 RX ADMIN — OXYCODONE HYDROCHLORIDE 5 MG: 5 TABLET ORAL at 16:47

## 2021-06-15 RX ADMIN — BACLOFEN 10 MG: 10 TABLET ORAL at 08:38

## 2021-06-15 RX ADMIN — OXYCODONE HYDROCHLORIDE 5 MG: 5 TABLET ORAL at 19:45

## 2021-06-15 RX ADMIN — POTASSIUM CHLORIDE 10 MEQ: 750 TABLET, EXTENDED RELEASE ORAL at 08:37

## 2021-06-15 RX ADMIN — BACLOFEN 10 MG: 10 TABLET ORAL at 22:23

## 2021-06-15 RX ADMIN — PREGABALIN 100 MG: 100 CAPSULE ORAL at 08:37

## 2021-06-15 RX ADMIN — OXYCODONE HYDROCHLORIDE 5 MG: 5 TABLET ORAL at 10:50

## 2021-06-15 RX ADMIN — BACLOFEN 10 MG: 10 TABLET ORAL at 16:48

## 2021-06-15 RX ADMIN — DOXYCYCLINE 100 MG: 100 CAPSULE ORAL at 08:37

## 2021-06-15 RX ADMIN — OXYCODONE HYDROCHLORIDE 5 MG: 5 TABLET ORAL at 08:38

## 2021-06-15 RX ADMIN — PREGABALIN 100 MG: 100 CAPSULE ORAL at 16:49

## 2021-06-15 RX ADMIN — FUROSEMIDE 20 MG: 20 TABLET ORAL at 08:37

## 2021-06-15 RX ADMIN — FINGOLIMOD HCL 0.5 MG: 0.5 CAPSULE ORAL at 08:44

## 2021-06-15 RX ADMIN — DULOXETINE HYDROCHLORIDE 30 MG: 30 CAPSULE, DELAYED RELEASE PELLETS ORAL at 08:37

## 2021-06-15 RX ADMIN — PREGABALIN 150 MG: 100 CAPSULE ORAL at 22:23

## 2021-06-15 RX ADMIN — ACETAMINOPHEN 650 MG: 325 TABLET, FILM COATED ORAL at 22:23

## 2021-06-15 RX ADMIN — STANDARDIZED SENNA CONCENTRATE 1 TABLET: 8.6 TABLET ORAL at 22:23

## 2021-06-15 RX ADMIN — LACTULOSE 30 ML: 10 SOLUTION ORAL at 08:37

## 2021-06-15 NOTE — PROGRESS NOTES
[facility] Nurse Inpatient Wound Assessment     Reason for consultation: Evaluate and treat coccyx, posterior thighs, heels, left calf, penile meatus; newer right groin and right great toe wounds    Assessment  -Coccyx: intact, scattered superficial friction/shear injuries that are now reepithelialized.  There is likely pressure component as well, but no obvious pressure injuries present at this time.  Pt on low air loss mattress and skin overall improving.  Focus on conditioning and moisturizing the skin; this plan seems working well, no new deterioration.   -Posterior upper thighs: superficial shearing injuries likely from briefs, L>R, nearly healed  -Right heel: Stage 2 vs healing Stage 3 CAPI; healing, shallow and clean  -Left heel: intact, newly resurfaced tissue, evidence of prior pressure injury  -Left lateral calf: old trauma injury that reopens per pt; scarred with small superficial open tissue, more open today  -Penile meatus: mild enlargement of meatal opening from chronic catheter; no wounds present, scant drainage this week  -Right inner groin: small wound at base of fold, unclear etiology, possible mechanical splitting of tissue, no s/s infection  -Right great toe: previous thick scabbing/ dry tissue has sloughed off to reveal small medial open area along nailbed, unclear etiology, possible prior ingrown toenail, no acute s/s infection but will need to monitor    Pt has MS and is wheel-chair bound.  At risk for further skin breakdown.  Pulsate mattress and Prevalon boots in place.     Treatment Plan  Coccyx/sacrum, buttocks, thighs: BID and prn:   1.  Cleanse with John perineal lotion and soft dry wipes  2.  Apply Sween 24 cream to all areas, except for any deep/moist skin folds  3.  Leave open to air   4.  If using briefs, ensure are loose/open under pt, and not digging into thighs  5.  PIP measures    Pressure Injury Prevention (PIP) Plan:  -If patient is declining  pressure injury prevention interventions: Explore reason why and address patient's concerns and Educate on pressure injury risk and prevention intervention(s)  -Mattress: Pulsate low air loss  -HOB: Maintain at or below 30 degrees, unless contraindicated  -Repositioning in bed: Every 1-2 hours , Left/right positioning; avoid supine and Raise foot of bed prior to raising head of bed, to reduce patient sliding down (shear)  -Heels: Keep elevated off mattress, Pillows under calves and Heel lift boots  -Protective Dressing: None - unless skin deteriorates, then can resume Mepilex  -Chair positioning: Assist patient to reposition hourly; use his own specialty wheelchair/cushion  -Moisture Management: Perineal cleansing /protection: Follow Incontinence Protocol, Clean and dry skin folds with bathing  and Moisturize dry skin  -Under Devices: Inspect skin under all medical devices during skin inspection , Ensure tubes are stabilized without tension and Ensure patient is not lying on medical devices or equipment when repositioned    Left lateral calf: every other day and prn:   1.  Cleanse with MicroKlenz  2.  Apply Optifoam Gentle Border foam dressing    Right heel: every other day and prn:  1.  Cleanse with MicroKlenz  2.  Swab periwound with skin prep, let dry  3.  Apply small piece of Aquacel Ag to open area  4.  Cover with Mepilex 4x4    Left heel:  Daily: Cleanse with bath wipes, then apply Sween 24 cream.  Apply Prevalon boots bilaterally when in bed.     Right great toe: Daily: cleanse thoroughly with MicroKlenz, pat dry.  Cover with PolyMem strip.  Notify WOC and MD if worsens or appears infected.     Right groin wound: BID and prn:   1.  Cleanse intact skin to groin area with John perineal lotion and dry cloths  2.  Cleanse the small open wound to inner right groin with MicroKlenz  3.  Apply small piece of Aquacel Ag along wound  4.  Ok to apply Interdry along groin folds also PRN if skin moist or rashy, keep in a  flat layer    Penile meatus: no wound cares needed; continue meticulous catheter and perineal cares and ensure catheter is secured appropriately, not pulling on meatus    Orders Updated  Recommended provider order: None, at this time  WO Nurse follow-up plan: weekly  Nursing to notify the Provider(s) and re-consult the WO Nurse if wound(s) deteriorates or new skin concern.    Patient History  According to provider note(s):  Rahul Ruffin is a 41 year old male who presents with need for placement     MS  Total cares, was living in care facility in [place] but wants to be placed here. Just got off plane and came to ED for placement. Reports no acute issues. Labs normal.  Mom says patient usually gets care at [facility] and [place].  I cannot access any records from him through epic.    Objective Data  Containment of urine/stool: Incontinence Protocol, Brief and Indwelling catheter    Active Diet Order:  Orders Placed This Encounter      Mechanical/Dental Soft Diet    Output:   I/O last 3 completed shifts:  In: 700 [P.O.:700]  Out: 1350 [Urine:1350]    Risk Assessment:   Sensory Perception: 2-->very limited  Moisture: 3-->occasionally moist  Activity: 2-->chairfast  Mobility: 1-->completely immobile  Nutrition: 4-->excellent  Friction and Shear: 2-->potential problem  Nick Score: 14                          Labs:   No lab results found in last 7 days.    Invalid input(s): GLUCOMBO    Physical Exam  Skin inspection: focused coccyx, posterior thighs, lower legs, heels, penis    Wound Location:  Coccyx   Date of last photo:  6-9-21      Wound History: per report, pt with poor hygiene on admission.  Pt bed/chair bound, can help with turns, but still needs assist x 1-2.  Chronic payne in place but incontinent of formed soft stool.   Measurements (length x width x depth, in cm): no longer open  Wound Base: all previously open areas are now reepithelialized  Palpation of the wound bed: normal   Periwound skin:  intact, darker pigmentation to buttocks, dry and flaky  Color: normal to darker brown  Temperature: normal   Drainage: none  Odor: none   Pain: denies      Wound Location:  Posterior thighs  Date of last photo:  6-9-21, see above  Wound History: present on admission, likely from brief  Measurements (length x width x depth, in cm): no longer open  Wound Base: reepithelialized  Palpation of the wound bed: normal   Periwound skin: intact.   Color: normal and consistent with surrounding tissue  Temperature: normal   Drainage: none noted  Odor: none  Pain: minimal    Wound Location:  Right heel  Date of last photo:  6-15-21    Wound History: present on admission.  Pt can slightly move legs but cannot easily reposition them on his own.    Measurements (length x width x depth, in cm):  Approx 1 x 0.6 x 0.2cm open area and 4 x 5cm general healing area/ debrided prior blister  Wound Base: pink-red moist friable  Tunneling: N/A  Undermining: N/A  Palpation of the wound bed: normal   Periwound skin: pink newly resurfaced tissue, remnants of blistered tissue at edges  Color: pink  Temperature: normal   Drainage: moderate  Description of drainage: bloody  Odor: none  Pain: mild      Wound Location:  Left heel  Date of last photo:  6-9-21      Wound History: present on admission  Measurements (length x width x depth, in cm): approx 2 x 2cm intact  Wound Base: intact scarred/calloused firm scaly tissue, looks newly healed over  Tunneling: N/A  Undermining: N/A  Palpation of the wound bed: firm   Periwound skin: flaky, scabby, dry, peely  Color: normal and consistent with surrounding tissue  Temperature: normal   Drainage: none  Odor: none  Pain: denies      Wound Location:  Left proximal posterior/lateral calf  Date of last photo:  6-15-21    Wound History: pt states is from minor trauma some years ago and area has had trouble staying healed  Measurements (length x width x depth, in cm):  approx 5 x 5cm, mostly intact, superficial  open areas measuring 3 x 1.2 x 0.1cm and 2x1x0.1cm   Wound Base: mostly pink-purple-brown scar tissue with scant pale pink and red moist tissue  Palpation of the wound bed: normal   Periwound skin: dry/scaly  Color: normal and consistent with surrounding tissue  Temperature: normal   Drainage: scant  Description of drainage: serosanguinous  Odor: none  Pain: minimal    Wound Location:  Right great toe  Date of last photo:  6-9-21      Wound History: unclear, but seems that area had thick dried scabby adherent drainage previously that has now sloughed itself off; question if pt had ingrown toenail intervention  Measurements (length x width x depth, in cm): medial to nailbed approx 0.3 x 0.2 x 0.2cm   Wound Base: moist pinkish tissue  Tunneling: N/A  Undermining: N/A  Palpation of the wound bed: normal   Periwound skin: intact  Color: normal and consistent with surrounding tissue  Temperature: normal   Drainage: moderate  Description of drainage: serosanguinous and bloody  Odor: none  Pain: mild to moderate    Wound Location:  Right inner groin  Date of last photo:  6-9-21      Wound History: unclear; reported by nursing to [facility] today  Measurements (length x width x depth, in cm):  approx 1.2 x 0.7 x 0.3cm   Wound Base: red moist ragged tissue  Tunneling: N/A  Undermining: N/A  Palpation of the wound bed: normal   Periwound skin: intact  Color: normal and consistent with surrounding tissue  Temperature: normal   Drainage: small  Description of drainage: bloody  Odor: none  Pain: mild      Interventions  Current support surface: Pulsate  Current off-loading measures: Pillows    Visual inspection of wound(s) completed  Wound Care: done per plan of care  Supplies: reviewed    Education provided: importance of repositioning, plan of care, wound progress, Infection prevention , Moisture management, Hygiene and Off-loading pressure  Discussed plan of care with Patient and Nurse    Selvin Vogt RN, CWOCN

## 2021-06-15 NOTE — PLAN OF CARE
A&Ox4. VSS on RA. Tylenol given for pain. Turn and repo q2. Feet elevated in protective boots. Dressing CDI. Reg diet and pt is a total feed due to tremors in upper extremities. Chronic payne in place. Waiting for medical assistance and placement.

## 2021-06-15 NOTE — PROGRESS NOTES
Observation goals PRIOR TO DISCHARGE     Comments:   -diagnostic tests and consults completed and resulted MET  -vital signs normal or at patient baseline MET  -infection is improving MET  -safe disposition plan has been identified NOT MET  Nurse to notify provider when observation goals have been met and patient is ready for discharge.

## 2021-06-15 NOTE — PROGRESS NOTES
[facility]  Hospitalist Progress Note  Robert Pelayo MD  06/15/2021    Assessment & Plan   Rahul Ruffin is a 41 year old male who was admitted on 5/27/2021 with need for placement.     Multiple sclerosis  Total cares, was living in care facility in [place] but wants to be placed here. Just got off plane and came to ED for placement. Reports no acute issues. Labs normal.  Mom says patient usually gets care at [facility] in [place]. Not able to access any records from him through epic.  ? Continue PTA medications, including Fingolimod, (will check LFT's) monitor patients for signs and symptoms of liver injury, such as unexplained vomiting, abdominal pain, fatigue, anorexia, or jaundice and/or dark urine, during treatment with Gilenya with periodic LFTs as recommended during and after treatment. LFTs normal.  ? Social work following to assist with placement.      Chronic indwelling payne  ? UA with heavy pyuria, likely chronic. No culture done.  ? Patient is afebrile with normal WBC count.   ? He is on doxycyline (per chart review by RN at his care facility in [place], Ph [phone]), it is being used for Acne, so continue.   ? Will change payne catheter     Chronic foot swelling  Neuropathic pain  Present x 8 months.   ? Tylenol PRN  ? He is already on maximal dose of gabapentin given 3 times daily.  Divided the dose to 4 times daily dosing, but he reports ongoing, pain  ? Changed to Lyrica, Started 75 mg 3 times daily on 6/4, increased to 75/75/150 mg on 6/7, increased to 100/100/150 starting on 6/15  ? Nursing notes had indicate patient had bilateral foot jerking and so PTA baclofen increased to 10 mg 3 times daily on 6/5  ? Minimize narcotic, appears neuropathic pain and not relieved by narcotic.     Constipation  - continue PTA lactulose  - continue senna 1 tab at bedtime     Chronic wounds  Upper L lateral calf and R heel, states baseline.   ? Wound cares     COVID-19 negative  "on admission     DVT Prophylaxis: Pneumatic Compression Devices  Code Status: Full Code  Expected discharge: >7 days once MA in place and bed found       Interval History   -- arouses  -- chart reviewed  -- purulence around payne meatus      -Data reviewed today: I reviewed all new labs and imaging over the last 24 hours. I personally reviewed no images or EKG's today.    Physical Exam    , Blood pressure 117/82, pulse 81, temperature 98.1  F (36.7  C), temperature source Oral, resp. rate 16, height 1.753 m (5' 9\"), weight 69.1 kg (152 lb 4.8 oz), SpO2 98 %.  Vitals:    06/12/21 0544   Weight: 69.1 kg (152 lb 4.8 oz)     Vital Signs with Ranges  Temp:  [97.5  F (36.4  C)-98.3  F (36.8  C)] 98.1  F (36.7  C)  Pulse:  [71-85] 81  Resp:  [14-17] 16  BP: (117-123)/(80-84) 117/82  SpO2:  [96 %-100 %] 98 %  I/O's Last 24 hours  I/O last 3 completed shifts:  In: 835 [P.O.:835]  Out: 1500 [Urine:1500]    Constitutional: cooperative, no apparent distress  Respiratory: Clear to auscultation bilaterally, no crackles or wheezing  Cardiovascular: Regular rate and rhythm, normal S1 and S2, and no murmur noted  GI: Normal bowel sounds, soft, non-distended, non-tender, payne with purulence  Skin/Integumen: No rashes, no cyanosis, no edema  Other:      Medications   All medications were reviewed.      baclofen  10 mg Oral TID     doxycycline hyclate  100 mg Oral Daily     DULoxetine  30 mg Oral Daily     fingolimod  0.5 mg Oral Daily     furosemide  20 mg Oral Daily     lactulose  30 mL Oral Daily     oxyCODONE  5 mg Oral TID     potassium chloride  10 mEq Oral Daily     pregabalin  100 mg Oral BID     pregabalin  150 mg Oral At Bedtime     pregabalin  25 mg Oral Once     sennosides  1 tablet Oral At Bedtime        Data   Recent Labs   Lab 06/15/21  0800      POTASSIUM 4.0   CHLORIDE 104   CO2 27   BUN 12   CR 0.80   ANIONGAP 4   SALINAS 9.4   GLC 82       No results found for this or any previous visit (from the past 24 " hour(s)).    Robert Pelayo MD  Text Page  (7am to 6pm)

## 2021-06-15 NOTE — PLAN OF CARE
[id number]:    No changes. PRN and scheduled Oxy given for pain control. Chronic wounds to left calf and right heel, wound care orders in place and performed. Rooke boots on. T/R. Regular diet, total feed due to upper extremity tremors. Chronic payne in place. A&Ox4. Awaiting for medical assistance and placement.

## 2021-06-16 LAB
ALBUMIN SERPL-MCNC: 3.2 G/DL (ref 3.4–5)
ALP SERPL-CCNC: 90 U/L (ref 40–150)
ALT SERPL W P-5'-P-CCNC: 32 U/L (ref 0–70)
AST SERPL W P-5'-P-CCNC: 20 U/L (ref 0–45)
BILIRUB DIRECT SERPL-MCNC: <0.1 MG/DL (ref 0–0.2)
BILIRUB SERPL-MCNC: 0.3 MG/DL (ref 0.2–1.3)
PROT SERPL-MCNC: 7.8 G/DL (ref 6.8–8.8)

## 2021-06-16 PROCEDURE — 250N000013 HC RX MED GY IP 250 OP 250 PS 637: Performed by: INTERNAL MEDICINE

## 2021-06-16 PROCEDURE — 80076 HEPATIC FUNCTION PANEL: CPT | Performed by: INTERNAL MEDICINE

## 2021-06-16 PROCEDURE — 99225 PR SUBSEQUENT OBSERVATION CARE,LEVEL II: CPT | Performed by: INTERNAL MEDICINE

## 2021-06-16 PROCEDURE — 36415 COLL VENOUS BLD VENIPUNCTURE: CPT | Performed by: INTERNAL MEDICINE

## 2021-06-16 PROCEDURE — 250N000013 HC RX MED GY IP 250 OP 250 PS 637: Performed by: HOSPITALIST

## 2021-06-16 PROCEDURE — G0378 HOSPITAL OBSERVATION PER HR: HCPCS

## 2021-06-16 PROCEDURE — 99207 PR CDG-CODE CATEGORY CHANGED: CPT | Performed by: INTERNAL MEDICINE

## 2021-06-16 RX ADMIN — PREGABALIN 150 MG: 100 CAPSULE ORAL at 16:10

## 2021-06-16 RX ADMIN — OXYCODONE HYDROCHLORIDE 5 MG: 5 TABLET ORAL at 16:10

## 2021-06-16 RX ADMIN — BACLOFEN 10 MG: 10 TABLET ORAL at 08:57

## 2021-06-16 RX ADMIN — BACLOFEN 10 MG: 10 TABLET ORAL at 22:12

## 2021-06-16 RX ADMIN — OXYCODONE HYDROCHLORIDE 5 MG: 5 TABLET ORAL at 08:58

## 2021-06-16 RX ADMIN — PREGABALIN 150 MG: 100 CAPSULE ORAL at 22:12

## 2021-06-16 RX ADMIN — FINGOLIMOD HCL 0.5 MG: 0.5 CAPSULE ORAL at 09:00

## 2021-06-16 RX ADMIN — LACTULOSE 30 ML: 10 SOLUTION ORAL at 08:57

## 2021-06-16 RX ADMIN — PREGABALIN 100 MG: 100 CAPSULE ORAL at 08:58

## 2021-06-16 RX ADMIN — OXYCODONE HYDROCHLORIDE 5 MG: 5 TABLET ORAL at 22:13

## 2021-06-16 RX ADMIN — DULOXETINE HYDROCHLORIDE 30 MG: 30 CAPSULE, DELAYED RELEASE PELLETS ORAL at 08:57

## 2021-06-16 RX ADMIN — POTASSIUM CHLORIDE 10 MEQ: 750 TABLET, EXTENDED RELEASE ORAL at 08:58

## 2021-06-16 RX ADMIN — DOXYCYCLINE 100 MG: 100 CAPSULE ORAL at 08:57

## 2021-06-16 RX ADMIN — BACLOFEN 10 MG: 10 TABLET ORAL at 16:11

## 2021-06-16 RX ADMIN — OXYCODONE HYDROCHLORIDE 5 MG: 5 TABLET ORAL at 00:06

## 2021-06-16 RX ADMIN — SENNOSIDES AND DOCUSATE SODIUM 2 TABLET: 8.6; 5 TABLET ORAL at 22:11

## 2021-06-16 RX ADMIN — FUROSEMIDE 20 MG: 20 TABLET ORAL at 08:58

## 2021-06-16 NOTE — PLAN OF CARE
3-7 PM: A/O x 4, resting in bed. Adequate I/O. Helm patent. Turn and repo q2h. Scheduled pain meds given. Continue to monitor.

## 2021-06-16 NOTE — PLAN OF CARE
Pt A&O. VSS on RA. Total feed, soft diet. Lift assist to personal wheelchair. C/o 10/10 bilateral foot/ ankle pain, although appears comfortable. Helm catheter changed. Incontinent stool x1. Coccyx and groin wound care per POC. T/R q2 on pulsate mattress. Discharge pending MA authorization and placment, SW following.

## 2021-06-16 NOTE — PROGRESS NOTES
[facility]  Hospitalist Progress Note  Robert Pelayo MD  06/16/2021    Assessment & Plan   Rahul Ruffin is a 41 year old male who was admitted on 5/27/2021 with need for placement.     Multiple sclerosis  Total cares, was living in care facility in [place] but wants to be placed here. Just got off plane and came to ED for placement. Reports no acute issues. Labs normal.  Mom says patient usually gets care at [facility] in [place]. Not able to access any records from him through epic.  ? Continue PTA medications, including Fingolimod, (will check LFT's) monitor patients for signs and symptoms of liver injury, such as unexplained vomiting, abdominal pain, fatigue, anorexia, or jaundice and/or dark urine, during treatment with Gilenya with periodic LFTs as recommended during and after treatment. LFTs normal.  ? Social work following to assist with placement.      Chronic indwelling payne  ? UA with heavy pyuria, likely chronic. No culture done.  ? Patient is afebrile with normal WBC count.   ? He is on doxycyline (per chart review by RN at his care facility in [place], Ph [phone]), it is being used for Acne, so continue.   ? Changed payne      Chronic foot swelling  Neuropathic pain  Present x 8 months.   ? Tylenol PRN  ? He is already on maximal dose of gabapentin given 3 times daily.  Divided the dose to 4 times daily dosing, but he reports ongoing, pain  ? Changed to Lyrica, Started 75 mg 3 times daily on 6/4, increased to 75/75/150 mg on 6/7, increased to 150/150 bid and 150 qhs  ? Nursing notes had indicate patient had bilateral foot jerking and so PTA baclofen increased to 10 mg 3 times daily on 6/5  ? Minimize narcotic, appears neuropathic pain and not relieved by narcotic.     Constipation  - continue PTA lactulose  - continue senna 1 tab at bedtime     Chronic wounds  Upper L lateral calf and R heel, states baseline.   ? Wound cares     COVID-19 negative on  "admission     DVT Prophylaxis: Pneumatic Compression Devices  Code Status: Full Code  Expected discharge: >7 days once MA in place and bed found       Interval History   -- c/o of lower leg spasms  -- chart reviewed  -- payne replaced      -Data reviewed today: I reviewed all new labs and imaging over the last 24 hours. I personally reviewed no images or EKG's today.    Physical Exam    , Blood pressure 120/86, pulse 72, temperature 98.3  F (36.8  C), temperature source Oral, resp. rate 16, height 1.753 m (5' 9\"), weight 69.1 kg (152 lb 4.8 oz), SpO2 99 %.  Vitals:    06/12/21 0544   Weight: 69.1 kg (152 lb 4.8 oz)     Vital Signs with Ranges  Temp:  [97.4  F (36.3  C)-99.1  F (37.3  C)] 98.3  F (36.8  C)  Pulse:  [65-74] 72  Resp:  [16-18] 16  BP: (113-125)/(77-86) 120/86  SpO2:  [96 %-100 %] 99 %  I/O's Last 24 hours  I/O last 3 completed shifts:  In: -   Out: 380 [Urine:380]    Constitutional: cooperative, no apparent distress  Respiratory: Clear to auscultation bilaterally, no crackles or wheezing  Cardiovascular: Regular rate and rhythm, normal S1 and S2, and no murmur noted  GI: Normal bowel sounds, soft, non-distended, non-tender, payne with purulence  Skin/Integumen: No rashes, no cyanosis, no edema  Other:      Medications   All medications were reviewed.      baclofen  10 mg Oral TID     doxycycline hyclate  100 mg Oral Daily     DULoxetine  30 mg Oral Daily     fingolimod  0.5 mg Oral Daily     furosemide  20 mg Oral Daily     lactulose  30 mL Oral Daily     oxyCODONE  5 mg Oral TID     potassium chloride  10 mEq Oral Daily     pregabalin  150 mg Oral BID     pregabalin  150 mg Oral At Bedtime     sennosides  1 tablet Oral At Bedtime        Data   Recent Labs   Lab 06/16/21  0756 06/15/21  0800   NA  --  135   POTASSIUM  --  4.0   CHLORIDE  --  104   CO2  --  27   BUN  --  12   CR  --  0.80   ANIONGAP  --  4   SALINAS  --  9.4   GLC  --  82   ALBUMIN 3.2*  --    PROTTOTAL 7.8  --    BILITOTAL 0.3  --  "   ALKPHOS 90  --    ALT 32  --    AST 20  --        No results found for this or any previous visit (from the past 24 hour(s)).    Robert Pelayo MD  Text Page  (7am to 6pm)

## 2021-06-17 PROCEDURE — 99207 PR CDG-CODE CATEGORY CHANGED: CPT | Performed by: INTERNAL MEDICINE

## 2021-06-17 PROCEDURE — G0378 HOSPITAL OBSERVATION PER HR: HCPCS

## 2021-06-17 PROCEDURE — 250N000013 HC RX MED GY IP 250 OP 250 PS 637: Performed by: HOSPITALIST

## 2021-06-17 PROCEDURE — 250N000013 HC RX MED GY IP 250 OP 250 PS 637: Performed by: INTERNAL MEDICINE

## 2021-06-17 PROCEDURE — 99225 PR SUBSEQUENT OBSERVATION CARE,LEVEL II: CPT | Performed by: INTERNAL MEDICINE

## 2021-06-17 RX ADMIN — OXYCODONE HYDROCHLORIDE 5 MG: 5 TABLET ORAL at 14:10

## 2021-06-17 RX ADMIN — BACLOFEN 10 MG: 10 TABLET ORAL at 17:06

## 2021-06-17 RX ADMIN — PREGABALIN 150 MG: 100 CAPSULE ORAL at 17:06

## 2021-06-17 RX ADMIN — OXYCODONE HYDROCHLORIDE 5 MG: 5 TABLET ORAL at 17:06

## 2021-06-17 RX ADMIN — FUROSEMIDE 20 MG: 20 TABLET ORAL at 08:45

## 2021-06-17 RX ADMIN — STANDARDIZED SENNA CONCENTRATE 1 TABLET: 8.6 TABLET ORAL at 22:09

## 2021-06-17 RX ADMIN — BACLOFEN 10 MG: 10 TABLET ORAL at 08:45

## 2021-06-17 RX ADMIN — DOXYCYCLINE 100 MG: 100 CAPSULE ORAL at 08:45

## 2021-06-17 RX ADMIN — OXYCODONE HYDROCHLORIDE 5 MG: 5 TABLET ORAL at 08:44

## 2021-06-17 RX ADMIN — FINGOLIMOD HCL 0.5 MG: 0.5 CAPSULE ORAL at 08:54

## 2021-06-17 RX ADMIN — LACTULOSE 30 ML: 10 SOLUTION ORAL at 08:45

## 2021-06-17 RX ADMIN — BACLOFEN 10 MG: 10 TABLET ORAL at 22:10

## 2021-06-17 RX ADMIN — PREGABALIN 150 MG: 100 CAPSULE ORAL at 08:45

## 2021-06-17 RX ADMIN — PREGABALIN 150 MG: 100 CAPSULE ORAL at 22:09

## 2021-06-17 RX ADMIN — OXYCODONE HYDROCHLORIDE 5 MG: 5 TABLET ORAL at 22:09

## 2021-06-17 RX ADMIN — OXYCODONE HYDROCHLORIDE 5 MG: 5 TABLET ORAL at 05:25

## 2021-06-17 RX ADMIN — DULOXETINE HYDROCHLORIDE 30 MG: 30 CAPSULE, DELAYED RELEASE PELLETS ORAL at 08:45

## 2021-06-17 RX ADMIN — POTASSIUM CHLORIDE 10 MEQ: 750 TABLET, EXTENDED RELEASE ORAL at 08:45

## 2021-06-17 NOTE — PLAN OF CARE
A/Ox4. VSS on RA. Pain control with prn oxy. Tolerating mech soft diet, feed. T/R q2. Up with lift. Helm patent with adequate output. Discharge pending placement.

## 2021-06-17 NOTE — PROGRESS NOTES
A&OX4, forgetful.  Total cares.  Turned and repositioned frequently.  C/o bilateral ankle/foot pain, prn oxycodone given.  Wound care done today.  Discharge is pending LTC placement.

## 2021-06-17 NOTE — PLAN OF CARE
Pt A&O. VSS on RA. Total feed, soft diet. Lift assist to personal wheelchair. C/o 10/10 bilateral foot/ ankle pain, although appears comfortable. Helm catheter  in place. Incontinent stool x1. Coccyx and groin wound C/D/I. T/R q2 on pulsate mattress. Discharge pending MA authorization and placment, SW following.

## 2021-06-17 NOTE — PROGRESS NOTES
Care Management Follow Up    Length of Stay (days): 0    Expected Discharge Date: 06/18/21     Concerns to be Addressed: discharge planning     Patient plan of care discussed at interdisciplinary rounds: Yes    Anticipated Discharge Disposition: Long Term Care     Anticipated Discharge Services:    Anticipated Discharge DME:      Patient/family educated on Medicare website which has current facility and service quality ratings: yes  Education Provided on the Discharge Plan:    Patient/Family in Agreement with the Plan: yes    Referrals Placed by CM/SW: Post Acute Facilities  Private pay costs discussed: Not applicable    Additional Information:  Patient has active MA. Referrals resent to [facility] and [facility] via [facility] for LTC placement.    Update: Call received from [facility] regarding referral. Writer informed that due to census and patient's wound care needs, they cannot accept him. There also was the suggestion of looking into group homes due to patient's age.     Writer called [facility] regarding referral. At this time they do not have a male bed but will review the referral deeper tomorrow as there are some male beds expected to open. Writer noted that patient would likely be assessed for waivered services now that MA is open and possibly pursue a group home and writer told this will not impact whether he would be accepted or not. Radha will be up writer with status tomorrow.    Call placed to [facility] to request an assessment and was told that when a patient is under 65 and goes to nursing home, the [facility] will be notified for the assessment and will be coordinated between SW at TCU/LTC and [facility]. Writer then realized that patient is getting benefits through [facility] but was told that it worked that way no matter the county.       CORWIN Gutierrez

## 2021-06-17 NOTE — PROGRESS NOTES
[facility]  Hospitalist Progress Note  Robert Pelayo MD  06/17/2021    Assessment & Plan   Rahul Ruffin is a 41 year old male who was admitted on 5/27/2021 with need for placement.     Multiple sclerosis  Total cares, was living in care facility in [place] but wants to be placed here. Just got off plane and came to ED for placement. Reports no acute issues. Labs normal.  Mom says patient usually gets care at [facility] in [place]. Not able to access any records from him through epic.  ? Continue PTA medications, including Fingolimod, LFTs normal.No vomiting, abdominal pain, fatigue, anorexia, or jaundice and/or dark urine, during treatment with Gilenya with periodic LFTs as recommended during and after treatment. LFTs normal.  ? Social work following to assist with placement.      Chronic indwelling payne  ? UA with heavy pyuria, likely chronic. No culture done.  ? Patient is afebrile with normal WBC count.   ? He is on doxycyline (per chart review by RN at his care facility in [place], Ph [phone]), it is being used for Acne, so continue.   ? Changed payne 6/15     Chronic foot swelling  Neuropathic pain  Present x 8 months.   ? Tylenol PRN  ? He is already on maximal dose of gabapentin given 3 times daily.  Divided the dose to 4 times daily dosing, but he reports ongoing, pain  ? Changed to Lyrica, Started 75 mg 3 times daily on 6/4, increased to 75/75/150 mg on 6/7, increased to 150/150 bid and 150 qhs  ? Nursing notes had indicate patient had bilateral foot jerking and so PTA baclofen increased to 10 mg 3 times daily on 6/15, improved pain control  ? Minimize narcotic, appears neuropathic pain and not relieved by narcotic.     Constipation  - continue PTA lactulose  - continue senna 1 tab at bedtime     Chronic wounds  Upper L lateral calf and R heel, states baseline.   ? Wound cares     COVID-19 negative on admission     DVT Prophylaxis: Pneumatic Compression Devices  Code  "Status: Full Code  Expected discharge: >7 days once MA in place and bed found       Interval History   -- improved leg spasms  -- no other active issues      -Data reviewed today: I reviewed all new labs and imaging over the last 24 hours. I personally reviewed no images or EKG's today.    Physical Exam    , Blood pressure 119/81, pulse 75, temperature 98.3  F (36.8  C), temperature source Oral, resp. rate 17, height 1.753 m (5' 9\"), weight 69.1 kg (152 lb 4.8 oz), SpO2 98 %.  Vitals:    06/12/21 0544   Weight: 69.1 kg (152 lb 4.8 oz)     Vital Signs with Ranges  Temp:  [98.3  F (36.8  C)-98.7  F (37.1  C)] 98.3  F (36.8  C)  Pulse:  [72-75] 75  Resp:  [16-17] 17  BP: (119-125)/(77-86) 119/81  SpO2:  [96 %-99 %] 98 %  I/O's Last 24 hours  I/O last 3 completed shifts:  In: 120 [P.O.:120]  Out: 1175 [Urine:1175]    Constitutional: cooperative, no apparent distress  Respiratory: Clear to auscultation bilaterally, no crackles or wheezing  Cardiovascular: Regular rate and rhythm, normal S1 and S2, and no murmur noted  GI: Normal bowel sounds, soft, non-distended, non-tender, payne with purulence  Skin/Integumen: No rashes, no cyanosis, no edema  Other:      Medications   All medications were reviewed.      baclofen  10 mg Oral TID     doxycycline hyclate  100 mg Oral Daily     DULoxetine  30 mg Oral Daily     fingolimod  0.5 mg Oral Daily     furosemide  20 mg Oral Daily     lactulose  30 mL Oral Daily     oxyCODONE  5 mg Oral TID     potassium chloride  10 mEq Oral Daily     pregabalin  150 mg Oral BID     pregabalin  150 mg Oral At Bedtime     sennosides  1 tablet Oral At Bedtime        Data   Recent Labs   Lab 06/16/21  0756 06/15/21  0800   NA  --  135   POTASSIUM  --  4.0   CHLORIDE  --  104   CO2  --  27   BUN  --  12   CR  --  0.80   ANIONGAP  --  4   SALINAS  --  9.4   GLC  --  82   ALBUMIN 3.2*  --    PROTTOTAL 7.8  --    BILITOTAL 0.3  --    ALKPHOS 90  --    ALT 32  --    AST 20  --        No results found for " this or any previous visit (from the past 24 hour(s)).    Robert Pelayo MD  Text Page  (7am to 6pm)

## 2021-06-18 PROCEDURE — 250N000013 HC RX MED GY IP 250 OP 250 PS 637: Performed by: INTERNAL MEDICINE

## 2021-06-18 PROCEDURE — 250N000013 HC RX MED GY IP 250 OP 250 PS 637: Performed by: HOSPITALIST

## 2021-06-18 PROCEDURE — G0378 HOSPITAL OBSERVATION PER HR: HCPCS

## 2021-06-18 PROCEDURE — 99225 PR SUBSEQUENT OBSERVATION CARE,LEVEL II: CPT | Performed by: INTERNAL MEDICINE

## 2021-06-18 PROCEDURE — 99207 PR CDG-CODE CATEGORY CHANGED: CPT | Performed by: INTERNAL MEDICINE

## 2021-06-18 PROCEDURE — 87635 SARS-COV-2 COVID-19 AMP PRB: CPT | Performed by: INTERNAL MEDICINE

## 2021-06-18 RX ADMIN — STANDARDIZED SENNA CONCENTRATE 1 TABLET: 8.6 TABLET ORAL at 21:21

## 2021-06-18 RX ADMIN — BACLOFEN 10 MG: 10 TABLET ORAL at 08:28

## 2021-06-18 RX ADMIN — DOXYCYCLINE 100 MG: 100 CAPSULE ORAL at 08:28

## 2021-06-18 RX ADMIN — OXYCODONE HYDROCHLORIDE 5 MG: 5 TABLET ORAL at 16:18

## 2021-06-18 RX ADMIN — PREGABALIN 150 MG: 100 CAPSULE ORAL at 08:28

## 2021-06-18 RX ADMIN — BACLOFEN 10 MG: 10 TABLET ORAL at 21:21

## 2021-06-18 RX ADMIN — FUROSEMIDE 20 MG: 20 TABLET ORAL at 08:28

## 2021-06-18 RX ADMIN — PREGABALIN 150 MG: 100 CAPSULE ORAL at 21:21

## 2021-06-18 RX ADMIN — PREGABALIN 150 MG: 100 CAPSULE ORAL at 16:18

## 2021-06-18 RX ADMIN — POTASSIUM CHLORIDE 10 MEQ: 750 TABLET, EXTENDED RELEASE ORAL at 08:28

## 2021-06-18 RX ADMIN — OXYCODONE HYDROCHLORIDE 5 MG: 5 TABLET ORAL at 08:28

## 2021-06-18 RX ADMIN — LACTULOSE 30 ML: 10 SOLUTION ORAL at 08:29

## 2021-06-18 RX ADMIN — DULOXETINE HYDROCHLORIDE 30 MG: 30 CAPSULE, DELAYED RELEASE PELLETS ORAL at 08:28

## 2021-06-18 RX ADMIN — FINGOLIMOD HCL 0.5 MG: 0.5 CAPSULE ORAL at 08:38

## 2021-06-18 RX ADMIN — OXYCODONE HYDROCHLORIDE 5 MG: 5 TABLET ORAL at 21:21

## 2021-06-18 RX ADMIN — BACLOFEN 10 MG: 10 TABLET ORAL at 16:18

## 2021-06-18 NOTE — PROGRESS NOTES
Care Management Follow Up    Length of Stay (days): 0    Expected Discharge Date: 06/18/21     Concerns to be Addressed: discharge planning     Patient plan of care discussed at interdisciplinary rounds: Yes    Anticipated Discharge Disposition: Long Term Care     Anticipated Discharge Services:    Anticipated Discharge DME:      Patient/family educated on Medicare website which has current facility and service quality ratings: yes  Education Provided on the Discharge Plan:    Patient/Family in Agreement with the Plan: yes    Referrals Placed by CM/SW: Post Acute Facilities  Private pay costs discussed: Not applicable    Additional Information:  MANDA JIM[facility]NEZ is active. LTC referrals sent to [facility] via [facility].       CORWIN Gutierrez

## 2021-06-18 NOTE — PLAN OF CARE
More clear, less confusion. Po ok. Skin care done to buttocks/thighs and periarea. States pain mostly in right foot. Turned q2h. Awaiting placement.

## 2021-06-18 NOTE — PROGRESS NOTES
[facility]  Hospitalist Progress Note  Robert Pelayo MD  06/18/2021    Assessment & Plan   Rahul Ruffin is a 41 year old male who was admitted on 5/27/2021 with need for placement.     Multiple sclerosis  Total cares, was living in care facility in [place] but wants to be placed here. Just got off plane and came to ED for placement. Reports no acute issues. Labs normal.  Mom says patient usually gets care at [facility] in [place]. Not able to access any records from him through epic.  ? Continue PTA medications, including Fingolimod, LFTs normal.No vomiting, abdominal pain, fatigue, anorexia, or jaundice and/or dark urine, during treatment with Gilenya with periodic LFTs as recommended during and after treatment. LFTs normal.  ? Social work following to assist with placement.      Chronic indwelling payne  ? UA with heavy pyuria, likely chronic. No culture done.  ? Patient is afebrile with normal WBC count.   ? He is on doxycyline (per chart review by RN at his care facility in [place], Ph [phone]), it is being used for Acne, so continue.   ? Changed payne 6/15     Chronic foot swelling  Neuropathic pain  Present x 8 months.   ? Tylenol PRN  ? He is already on maximal dose of gabapentin given 3 times daily.  Divided the dose to 4 times daily dosing, but he reports ongoing, pain  ? Changed to Lyrica, Started 75 mg 3 times daily on 6/4, increased to 75/75/150 mg on 6/7, increased to 150/150 bid and 150 qhs  ? Nursing notes had indicate patient had bilateral foot jerking and so PTA baclofen increased to 10 mg 3 times daily on 6/15, improved pain control  ? Minimize narcotic, appears neuropathic pain and not relieved by narcotic.  ? He is more alert and less sedated     Constipation  - continue PTA lactulose  - continue senna 1 tab at bedtime     Chronic wounds  Upper L lateral calf and R heel, states baseline.   ? Wound cares     COVID-19 negative on admission     DVT  "Prophylaxis: Pneumatic Compression Devices  Code Status: Full Code  Expected discharge: in 2-3 days once MA in place and bed found       Interval History   -- improved pain control  -- he is more alert      -Data reviewed today: I reviewed all new labs and imaging over the last 24 hours. I personally reviewed no images or EKG's today.    Physical Exam    , Blood pressure 116/82, pulse 76, temperature 97.5  F (36.4  C), temperature source Oral, resp. rate 16, height 1.753 m (5' 9\"), weight 69.1 kg (152 lb 4.8 oz), SpO2 98 %.  Vitals:    06/12/21 0544   Weight: 69.1 kg (152 lb 4.8 oz)     Vital Signs with Ranges  Temp:  [97.5  F (36.4  C)-98.6  F (37  C)] 97.5  F (36.4  C)  Pulse:  [73-85] 76  Resp:  [16-18] 16  BP: (116-129)/(82-92) 116/82  SpO2:  [96 %-99 %] 98 %  I/O's Last 24 hours  I/O last 3 completed shifts:  In: 350 [P.O.:350]  Out: 1100 [Urine:1100]    Constitutional: cooperative, no apparent distress  Respiratory: Clear to auscultation bilaterally, no crackles or wheezing  Cardiovascular: Regular rate and rhythm, normal S1 and S2, and no murmur noted  GI: Normal bowel sounds, soft, non-distended, non-tender, payne with purulence  Skin/Integumen: No rashes, no cyanosis, no edema  Other:      Medications   All medications were reviewed.      baclofen  10 mg Oral TID     doxycycline hyclate  100 mg Oral Daily     DULoxetine  30 mg Oral Daily     fingolimod  0.5 mg Oral Daily     furosemide  20 mg Oral Daily     lactulose  30 mL Oral Daily     oxyCODONE  5 mg Oral TID     potassium chloride  10 mEq Oral Daily     pregabalin  150 mg Oral BID     pregabalin  150 mg Oral At Bedtime     sennosides  1 tablet Oral At Bedtime        Data   Recent Labs   Lab 06/16/21  0756 06/15/21  0800   NA  --  135   POTASSIUM  --  4.0   CHLORIDE  --  104   CO2  --  27   BUN  --  12   CR  --  0.80   ANIONGAP  --  4   SALINAS  --  9.4   GLC  --  82   ALBUMIN 3.2*  --    PROTTOTAL 7.8  --    BILITOTAL 0.3  --    ALKPHOS 90  --    ALT 32  " --    AST 20  --        No results found for this or any previous visit (from the past 24 hour(s)).    Robert Pelayo MD  Text Page  (7am to 6pm)

## 2021-06-19 PROCEDURE — 250N000013 HC RX MED GY IP 250 OP 250 PS 637: Performed by: INTERNAL MEDICINE

## 2021-06-19 PROCEDURE — 99225 PR SUBSEQUENT OBSERVATION CARE,LEVEL II: CPT | Performed by: HOSPITALIST

## 2021-06-19 PROCEDURE — G0378 HOSPITAL OBSERVATION PER HR: HCPCS

## 2021-06-19 PROCEDURE — 250N000013 HC RX MED GY IP 250 OP 250 PS 637: Performed by: HOSPITALIST

## 2021-06-19 PROCEDURE — 99207 PR CDG-CUT & PASTE-POTENTIAL IMPACT ON LEVEL: CPT | Performed by: HOSPITALIST

## 2021-06-19 RX ADMIN — POTASSIUM CHLORIDE 10 MEQ: 750 TABLET, EXTENDED RELEASE ORAL at 08:12

## 2021-06-19 RX ADMIN — DOXYCYCLINE 100 MG: 100 CAPSULE ORAL at 08:12

## 2021-06-19 RX ADMIN — OXYCODONE HYDROCHLORIDE 5 MG: 5 TABLET ORAL at 08:12

## 2021-06-19 RX ADMIN — LACTULOSE 30 ML: 10 SOLUTION ORAL at 08:11

## 2021-06-19 RX ADMIN — PREGABALIN 150 MG: 100 CAPSULE ORAL at 08:11

## 2021-06-19 RX ADMIN — BACLOFEN 10 MG: 10 TABLET ORAL at 21:35

## 2021-06-19 RX ADMIN — BACLOFEN 10 MG: 10 TABLET ORAL at 16:53

## 2021-06-19 RX ADMIN — PREGABALIN 150 MG: 100 CAPSULE ORAL at 21:35

## 2021-06-19 RX ADMIN — OXYCODONE HYDROCHLORIDE 5 MG: 5 TABLET ORAL at 21:35

## 2021-06-19 RX ADMIN — PREGABALIN 150 MG: 100 CAPSULE ORAL at 16:53

## 2021-06-19 RX ADMIN — FUROSEMIDE 20 MG: 20 TABLET ORAL at 08:12

## 2021-06-19 RX ADMIN — DULOXETINE HYDROCHLORIDE 30 MG: 30 CAPSULE, DELAYED RELEASE PELLETS ORAL at 08:12

## 2021-06-19 RX ADMIN — BACLOFEN 10 MG: 10 TABLET ORAL at 08:13

## 2021-06-19 RX ADMIN — OXYCODONE HYDROCHLORIDE 5 MG: 5 TABLET ORAL at 01:12

## 2021-06-19 RX ADMIN — OXYCODONE HYDROCHLORIDE 5 MG: 5 TABLET ORAL at 23:56

## 2021-06-19 RX ADMIN — STANDARDIZED SENNA CONCENTRATE 1 TABLET: 8.6 TABLET ORAL at 21:35

## 2021-06-19 RX ADMIN — FINGOLIMOD HCL 0.5 MG: 0.5 CAPSULE ORAL at 08:15

## 2021-06-19 RX ADMIN — ACETAMINOPHEN 650 MG: 325 TABLET, FILM COATED ORAL at 01:12

## 2021-06-19 RX ADMIN — OXYCODONE HYDROCHLORIDE 5 MG: 5 TABLET ORAL at 16:54

## 2021-06-19 NOTE — PLAN OF CARE
A&Ox4, very pleasant. Total cares. Soft call light, calls appropriately. Turned and repositioned frequently.  C/o bilateral ankle/foot pain, prn oxycodone and tylenol given. Discharge is pending LTC placement.

## 2021-06-19 NOTE — PROGRESS NOTES
[facility]  Hospitalist Progress Note  Paco Hardin, DO  06/19/2021    Assessment & Plan   Rahul Ruffin is a 41 year old male who was admitted on 5/27/2021 with need for placement.     Multiple sclerosis  Total cares, was living in care facility in [place] but wants to be placed here. Just got off plane and came to ED for placement. Reports no acute issues. Labs normal.  Mom says patient usually gets care at [facility] in [place]. Not able to access any records from him through epic.  ? Continue PTA medications, including Fingolimod, LFTs normal.No vomiting, abdominal pain, fatigue, anorexia, or jaundice and/or dark urine, during treatment with Gilenya with periodic LFTs as recommended during and after treatment. LFTs normal.  ? Social work following to assist with placement.      Chronic indwelling payne  ? UA with heavy pyuria, likely chronic. No culture done.  ? Patient is afebrile with normal WBC count.   ? He is on doxycyline (per chart review by RN at his care facility in [place], Ph [phone]), it is being used for Acne, so continue.   ? Changed payne 6/15     Chronic foot swelling  Neuropathic pain  Present x 8 months.   ? Tylenol PRN  ? He is already on maximal dose of gabapentin given 3 times daily.  Divided the dose to 4 times daily dosing, but he reports ongoing, pain  ? Changed to Lyrica, Started 75 mg 3 times daily on 6/4, increased to 75/75/150 mg on 6/7, increased to 150/150 bid and 150 qhs  ? Nursing notes had indicate patient had bilateral foot jerking and so PTA baclofen increased to 10 mg 3 times daily on 6/15, improved pain control  ? Minimize narcotic, appears neuropathic pain and not relieved by narcotic.  ? He is more alert and less sedated     Constipation  - continue PTA lactulose  - continue senna 1 tab at bedtime     Chronic wounds  Upper L lateral calf and R heel, states baseline.   ? Wound cares     COVID-19 negative on admission     DVT  "Prophylaxis: Pneumatic Compression Devices  Code Status: Full Code  Expected discharge: in 2-3 days once MA in place and bed found       Interval History   -- improved pain control, but still present      -Data reviewed today: I reviewed all new labs and imaging over the last 24 hours. I personally reviewed no images or EKG's today.    Physical Exam    , Blood pressure 118/69, pulse 69, temperature 98  F (36.7  C), temperature source Oral, resp. rate 18, height 1.753 m (5' 9\"), weight 69.1 kg (152 lb 4.8 oz), SpO2 96 %.  Vitals:    06/12/21 0544   Weight: 69.1 kg (152 lb 4.8 oz)     Vital Signs with Ranges  Temp:  [98  F (36.7  C)-98.3  F (36.8  C)] 98  F (36.7  C)  Pulse:  [69-77] 69  Resp:  [16-18] 18  BP: (115-120)/(69-83) 118/69  SpO2:  [96 %-99 %] 96 %  I/O's Last 24 hours  I/O last 3 completed shifts:  In: 360 [P.O.:360]  Out: 1100 [Urine:1100]    Constitutional: cooperative, no apparent distress  Respiratory: Clear to auscultation bilaterally, no crackles or wheezing  Cardiovascular: Regular rate and rhythm, normal S1 and S2, and no murmur noted  GI: Normal bowel sounds, soft, non-distended, non-tender, payne with purulence  Skin/Integumen: No rashes, no cyanosis, no edema  Other:      Medications   All medications were reviewed.      baclofen  10 mg Oral TID     doxycycline hyclate  100 mg Oral Daily     DULoxetine  30 mg Oral Daily     fingolimod  0.5 mg Oral Daily     furosemide  20 mg Oral Daily     lactulose  30 mL Oral Daily     oxyCODONE  5 mg Oral TID     potassium chloride  10 mEq Oral Daily     pregabalin  150 mg Oral BID     pregabalin  150 mg Oral At Bedtime     sennosides  1 tablet Oral At Bedtime        Data   Recent Labs   Lab 06/16/21  0756 06/15/21  0800   NA  --  135   POTASSIUM  --  4.0   CHLORIDE  --  104   CO2  --  27   BUN  --  12   CR  --  0.80   ANIONGAP  --  4   SALINAS  --  9.4   GLC  --  82   ALBUMIN 3.2*  --    PROTTOTAL 7.8  --    BILITOTAL 0.3  --    ALKPHOS 90  --    ALT 32  --  "   AST 20  --        No results found for this or any previous visit (from the past 24 hour(s)).  Paco Hardin DO  Hospitalist [facility]  Pager: [phone]

## 2021-06-20 PROCEDURE — 250N000013 HC RX MED GY IP 250 OP 250 PS 637: Performed by: HOSPITALIST

## 2021-06-20 PROCEDURE — G0378 HOSPITAL OBSERVATION PER HR: HCPCS

## 2021-06-20 PROCEDURE — 250N000013 HC RX MED GY IP 250 OP 250 PS 637: Performed by: INTERNAL MEDICINE

## 2021-06-20 PROCEDURE — 99225 PR SUBSEQUENT OBSERVATION CARE,LEVEL II: CPT | Performed by: HOSPITALIST

## 2021-06-20 PROCEDURE — 99207 PR CDG-CUT & PASTE-POTENTIAL IMPACT ON LEVEL: CPT | Performed by: HOSPITALIST

## 2021-06-20 RX ORDER — BACLOFEN 10 MG/1
10 TABLET ORAL 2 TIMES DAILY PRN
Status: DISCONTINUED | OUTPATIENT
Start: 2021-06-20 | End: 2021-06-21 | Stop reason: HOSPADM

## 2021-06-20 RX ADMIN — OXYCODONE HYDROCHLORIDE 5 MG: 5 TABLET ORAL at 08:04

## 2021-06-20 RX ADMIN — BACLOFEN 10 MG: 10 TABLET ORAL at 08:02

## 2021-06-20 RX ADMIN — PREGABALIN 150 MG: 100 CAPSULE ORAL at 08:04

## 2021-06-20 RX ADMIN — STANDARDIZED SENNA CONCENTRATE 1 TABLET: 8.6 TABLET ORAL at 21:54

## 2021-06-20 RX ADMIN — BACLOFEN 10 MG: 10 TABLET ORAL at 16:43

## 2021-06-20 RX ADMIN — DOXYCYCLINE 100 MG: 100 CAPSULE ORAL at 08:02

## 2021-06-20 RX ADMIN — PREGABALIN 150 MG: 100 CAPSULE ORAL at 16:43

## 2021-06-20 RX ADMIN — PREGABALIN 150 MG: 100 CAPSULE ORAL at 21:53

## 2021-06-20 RX ADMIN — OXYCODONE HYDROCHLORIDE 5 MG: 5 TABLET ORAL at 16:44

## 2021-06-20 RX ADMIN — DULOXETINE HYDROCHLORIDE 30 MG: 30 CAPSULE, DELAYED RELEASE PELLETS ORAL at 08:02

## 2021-06-20 RX ADMIN — LACTULOSE 30 ML: 10 SOLUTION ORAL at 08:04

## 2021-06-20 RX ADMIN — FINGOLIMOD HCL 0.5 MG: 0.5 CAPSULE ORAL at 08:05

## 2021-06-20 RX ADMIN — OXYCODONE HYDROCHLORIDE 5 MG: 5 TABLET ORAL at 21:53

## 2021-06-20 RX ADMIN — FUROSEMIDE 20 MG: 20 TABLET ORAL at 08:04

## 2021-06-20 RX ADMIN — BACLOFEN 10 MG: 10 TABLET ORAL at 21:54

## 2021-06-20 RX ADMIN — BACLOFEN 10 MG: 10 TABLET ORAL at 12:15

## 2021-06-20 RX ADMIN — POTASSIUM CHLORIDE 10 MEQ: 750 TABLET, EXTENDED RELEASE ORAL at 08:04

## 2021-06-20 NOTE — PROGRESS NOTES
A&O, vss on RA. Repositioning q2hr. Helm patent. Oxy for pain. Total Care/Total feed. IV SL. Pending placement.

## 2021-06-20 NOTE — PLAN OF CARE
DATE & TIME: 6/19/21 @ 9013-2727  SUMMARY: MS     Cognitive Concerns/ Orientation : Aox4 w/ forgetfulness   BEHAVIOR & AGGRESSION TOOL COLOR: Green   ABNL VS/O2: VSS on RA  MOBILITY: Ax2 w/ lift  PAIN MANAGMENT: Scheduled Baclofen, Oxycodone, and Lyrica. Declined any other interventions  DIET: Regular diet- Total feed  BOWEL/BLADDER: Helm intact/patent. Incont of bowel. + flatus, + BS, - BM thisshift  ABNL LAB/BG: Not drawn since 6/16  DRAIN/DEVICES: No PIV access  TELEMETRY RHYTHM: NA  SKIN: Dressing to R big toe- CDI. R heel mepi- CDI. LLE mepi- CDI.   TESTS/PROCEDURES: NA  D/C DAY/GOALS/PLACE: Plan to discharge to LTC- referrals sent    OTHER IMPORTANT INFO: NA

## 2021-06-20 NOTE — PROGRESS NOTES
[facility]  Hospitalist Progress Note  Paco Hardin, DO  06/20/2021    Assessment & Plan   Rahul Ruffin is a 41 year old male who was admitted on 5/27/2021 with need for placement.     Multiple sclerosis  Total cares, was living in care facility in [place] but wants to be placed here. Just got off plane and came to ED for placement. Reports no acute issues. Labs normal.  Mom says patient usually gets care at [facility] in [place]. Not able to access any records from him through epic.  ? Continue PTA medications, including Fingolimod, LFTs normal.No vomiting, abdominal pain, fatigue, anorexia, or jaundice and/or dark urine, during treatment with Gilenya with periodic LFTs as recommended during and after treatment. LFTs normal.  ? Social work following to assist with placement.      Chronic indwelling payne  ? UA with heavy pyuria, likely chronic. No culture done.  ? Patient is afebrile with normal WBC count.   ? He is on doxycyline (per chart review by RN at his care facility in [place], Ph [phone]), it is being used for Acne, so continue.   ? Changed payne 6/15     Chronic foot swelling  Neuropathic pain  Present x 8 months.   ? Tylenol PRN  ? He is already on maximal dose of gabapentin given 3 times daily.  Divided the dose to 4 times daily dosing, but he reports ongoing, pain  ? Changed to Lyrica, Started 75 mg 3 times daily on 6/4, increased to 75/75/150 mg on 6/7, increased to 150/150 bid and 150 qhs  ? Nursing notes had indicate patient had bilateral foot jerking and so PTA baclofen increased to 10 mg 3 times daily on 6/15, improved pain control  ? Minimize narcotic, appears neuropathic pain and not relieved by narcotic.  ? He is more alert and less sedated  ? Added prn baclofen bid     Constipation  - continue PTA lactulose  - continue senna 1 tab at bedtime     Chronic wounds  Upper L lateral calf and R heel, states baseline.   ? Wound cares     COVID-19 negative on  "admission     DVT Prophylaxis: Pneumatic Compression Devices  Code Status: Full Code  Expected discharge: in 2-3 days once MA in place and bed found       Interval History   -- improved pain control, but still present. Asking for more medications, would avoid escalating narcotics      -Data reviewed today: I reviewed all new labs and imaging over the last 24 hours. I personally reviewed no images or EKG's today.    Physical Exam    , Blood pressure 116/60, pulse 62, temperature 97.6  F (36.4  C), temperature source Oral, resp. rate 14, height 1.753 m (5' 9\"), weight 69.1 kg (152 lb 4.8 oz), SpO2 99 %.  Vitals:    06/12/21 0544   Weight: 69.1 kg (152 lb 4.8 oz)     Vital Signs with Ranges  Temp:  [97.6  F (36.4  C)-98  F (36.7  C)] 97.6  F (36.4  C)  Pulse:  [61-70] 62  Resp:  [14-18] 14  BP: (114-120)/(60-78) 116/60  SpO2:  [96 %-99 %] 99 %  I/O's Last 24 hours  I/O last 3 completed shifts:  In: 530 [P.O.:530]  Out: 700 [Urine:700]    Constitutional: cooperative, no apparent distress  Respiratory: Clear to auscultation bilaterally, no crackles or wheezing  Cardiovascular: Regular rate and rhythm, normal S1 and S2, and no murmur noted  GI: Normal bowel sounds, soft, non-distended, non-tender, payne with purulence  Skin/Integumen: No rashes, no cyanosis, no edema  Other:      Medications   All medications were reviewed.      baclofen  10 mg Oral TID     doxycycline hyclate  100 mg Oral Daily     DULoxetine  30 mg Oral Daily     fingolimod  0.5 mg Oral Daily     furosemide  20 mg Oral Daily     lactulose  30 mL Oral Daily     oxyCODONE  5 mg Oral TID     potassium chloride  10 mEq Oral Daily     pregabalin  150 mg Oral BID     pregabalin  150 mg Oral At Bedtime     sennosides  1 tablet Oral At Bedtime        Data   Recent Labs   Lab 06/16/21  0756 06/15/21  0800   NA  --  135   POTASSIUM  --  4.0   CHLORIDE  --  104   CO2  --  27   BUN  --  12   CR  --  0.80   ANIONGAP  --  4   SALINAS  --  9.4   GLC  --  82   ALBUMIN 3.2* "  --    PROTTOTAL 7.8  --    BILITOTAL 0.3  --    ALKPHOS 90  --    ALT 32  --    AST 20  --        No results found for this or any previous visit (from the past 24 hour(s)).  Paco Hardin DO  Hospitalist [facility]  Pager: [phone]

## 2021-06-20 NOTE — PLAN OF CARE
Pt A&Ox 4. VSS on RA.  Turn side to side overnight. Helm patent. Pain managed with PRN oxycodone x 1 this shift. IV SL. Placement pending.

## 2021-06-21 VITALS
RESPIRATION RATE: 16 BRPM | WEIGHT: 152.3 LBS | OXYGEN SATURATION: 99 % | HEIGHT: 69 IN | SYSTOLIC BLOOD PRESSURE: 125 MMHG | BODY MASS INDEX: 22.56 KG/M2 | TEMPERATURE: 97.9 F | HEART RATE: 65 BPM | DIASTOLIC BLOOD PRESSURE: 86 MMHG

## 2021-06-21 PROCEDURE — 250N000013 HC RX MED GY IP 250 OP 250 PS 637: Performed by: INTERNAL MEDICINE

## 2021-06-21 PROCEDURE — 250N000013 HC RX MED GY IP 250 OP 250 PS 637: Performed by: HOSPITALIST

## 2021-06-21 PROCEDURE — 99217 PR OBSERVATION CARE DISCHARGE: CPT | Performed by: HOSPITALIST

## 2021-06-21 PROCEDURE — G0378 HOSPITAL OBSERVATION PER HR: HCPCS

## 2021-06-21 RX ORDER — ACETAMINOPHEN 325 MG/1
650 TABLET ORAL EVERY 4 HOURS PRN
DISCHARGE
Start: 2021-06-21

## 2021-06-21 RX ORDER — BACLOFEN 10 MG/1
10 TABLET ORAL 3 TIMES DAILY
DISCHARGE
Start: 2021-06-21 | End: 2023-01-01

## 2021-06-21 RX ORDER — BACLOFEN 10 MG/1
10 TABLET ORAL 2 TIMES DAILY PRN
DISCHARGE
Start: 2021-06-21

## 2021-06-21 RX ORDER — OXYCODONE HYDROCHLORIDE 5 MG/1
5 TABLET ORAL 2 TIMES DAILY PRN
Qty: 10 TABLET | Refills: 0 | Status: SHIPPED | OUTPATIENT
Start: 2021-06-21

## 2021-06-21 RX ORDER — PREGABALIN 150 MG/1
150 CAPSULE ORAL 2 TIMES DAILY
Qty: 60 CAPSULE | Refills: 0 | Status: SHIPPED | OUTPATIENT
Start: 2021-06-21

## 2021-06-21 RX ORDER — PREGABALIN 150 MG/1
150 CAPSULE ORAL AT BEDTIME
Qty: 30 CAPSULE | Refills: 0 | Status: SHIPPED | OUTPATIENT
Start: 2021-06-21

## 2021-06-21 RX ADMIN — OXYCODONE HYDROCHLORIDE 5 MG: 5 TABLET ORAL at 15:03

## 2021-06-21 RX ADMIN — DOXYCYCLINE 100 MG: 100 CAPSULE ORAL at 08:14

## 2021-06-21 RX ADMIN — BACLOFEN 10 MG: 10 TABLET ORAL at 00:01

## 2021-06-21 RX ADMIN — FINGOLIMOD HCL 0.5 MG: 0.5 CAPSULE ORAL at 08:16

## 2021-06-21 RX ADMIN — PREGABALIN 150 MG: 100 CAPSULE ORAL at 08:14

## 2021-06-21 RX ADMIN — LACTULOSE 30 ML: 10 SOLUTION ORAL at 08:15

## 2021-06-21 RX ADMIN — BACLOFEN 10 MG: 10 TABLET ORAL at 08:14

## 2021-06-21 RX ADMIN — BACLOFEN 10 MG: 10 TABLET ORAL at 15:03

## 2021-06-21 RX ADMIN — FUROSEMIDE 20 MG: 20 TABLET ORAL at 08:14

## 2021-06-21 RX ADMIN — PREGABALIN 150 MG: 100 CAPSULE ORAL at 15:02

## 2021-06-21 RX ADMIN — OXYCODONE HYDROCHLORIDE 5 MG: 5 TABLET ORAL at 08:14

## 2021-06-21 RX ADMIN — DULOXETINE HYDROCHLORIDE 30 MG: 30 CAPSULE, DELAYED RELEASE PELLETS ORAL at 08:14

## 2021-06-21 RX ADMIN — POTASSIUM CHLORIDE 10 MEQ: 750 TABLET, EXTENDED RELEASE ORAL at 08:14

## 2021-06-21 NOTE — DISCHARGE SUMMARY
[facility]  Hospitalist Discharge Summary      Date of Admission:  5/27/2021  Date of Discharge:  6/21/2021  Discharging Provider: Paco Hardin, DO      Discharge Diagnoses   MS  Need for placement    Follow-ups Needed After Discharge   Follow-up Appointments     Follow Up and recommended labs and tests      Follow up with Nursing home physician.  No follow up labs or test are   needed.    Needs to establish with local neurologist for the MS             Unresulted Labs Ordered in the Past 30 Days of this Admission     No orders found from 4/27/2021 to 5/28/2021.      These results will be followed up by PCP    Discharge Disposition   Discharged to long-term care facility  Condition at discharge: Stable      Hospital Course      Multiple sclerosis  Total cares, was living in care facility in [place] but wants to be placed here. Just got off plane and came to ED for placement. Reports no acute issues. Labs normal.  Mom says patient usually gets care at [facility] in [place]. Not able to access any records from him through epic.  ? Continue PTA medications, including Fingolimod, LFTs normal.No vomiting, abdominal pain, fatigue, anorexia, or jaundice and/or dark urine, during treatment with Gilenya with periodic LFTs as recommended during and after treatment. LFTs normal.  ? Social work following to assist with placement.      Chronic indwelling payne  ? UA with heavy pyuria, likely chronic. No culture done.  ? Patient is afebrile with normal WBC count.   ? He is on doxycyline (per chart review by RN at his care facility in [place], Ph [phone]), it is being used for Acne, so continue.   ? Changed payne 6/15     Chronic foot swelling  Neuropathic pain  Present x 8 months.   ? Tylenol PRN  ? He is already on maximal dose of gabapentin given 3 times daily.  Divided the dose to 4 times daily dosing, but he reports ongoing, pain  ? Changed to Lyrica, Started 75 mg 3 times daily on 6/4,  increased to 75/75/150 mg on 6/7, increased to 150/150 bid and 150 qhs  ? Nursing notes had indicate patient had bilateral foot jerking and so PTA baclofen increased to 10 mg 3 times daily on 6/15, improved pain control  ? Minimize narcotic, appears neuropathic pain and not relieved by narcotic.  ? He is more alert and less sedated  ? Added prn baclofen bid     Constipation  - continue PTA lactulose  - continue senna 1 tab at bedtime     Chronic wounds  Upper L lateral calf and R heel, states baseline.   ? Wound cares    Consultations This Hospital Stay   CARE MANAGEMENT / SOCIAL WORK IP CONSULT  WOUND OSTOMY CONTINENCE NURSE  IP CONSULT  PHYSICAL THERAPY ADULT IP CONSULT  OCCUPATIONAL THERAPY ADULT IP CONSULT    Code Status   Full Code    Time Spent on this Encounter   I, Paco Hardin DO, personally saw the patient today and spent greater than 30 minutes discharging this patient.       Paco Hardin DO  [place] [address] [facility]  [address] [place] [address]  Phone: [phone]  ______________________________________________________________________    Physical Exam   Vital Signs: Temp: 97.7  F (36.5  C) Temp src: Axillary BP: (!) 128/91 Pulse: 69   Resp: 16 SpO2: 100 % O2 Device: None (Room air)    Weight: 152 lbs 4.8 oz  Constitutional: cooperative, no apparent distress  Respiratory:     Clear to auscultation bilaterally, no crackles or wheezing  Cardiovascular: Regular rate and rhythm, normal S1 and S2, and no murmur noted  GI: Normal bowel sounds, soft, non-distended, non-tender, payne with purulence  Skin/Integumen: No rashes, no cyanosis, no edema  Other:             Primary Care Physician   Physician No Ref-Primary    Discharge Orders      Wound care and dressings    Instructions to care for your wound at home:   Coccyx/sacrum, buttocks, thighs: BID and prn:   1.  Cleanse with John perineal lotion and soft dry wipes  2.  Apply Sween 24 cream to all areas,  except for any deep/moist skin folds  3.  Leave open to air   4.  If using briefs, ensure are loose/open under pt, and not digging into thighs    Left lateral calf: every other day and prn:   1.  Cleanse with MicroKlenz  2.  Apply Optifoam Gentle Border foam dressing     Right heel: every other day and prn:  1.  Cleanse with MicroKlenz  2.  Swab periwound with skin prep, let dry  3.  Apply small piece of Aquacel Ag to open area  4.  Cover with Mepilex 4x4     Left heel:  Daily: Cleanse with bath wipes, then apply Sween 24 cream.  Apply Prevalon boots bilaterally when in bed.      Right great toe: Daily: cleanse thoroughly with MicroKlenz, pat dry.  Cover with PolyMem strip.  Notify WOC and MD if worsens or appears infected.      Right groin wound: BID and prn:   1.  Cleanse intact skin to groin area with John perineal lotion and dry cloths  2.  Cleanse the small open wound to inner right groin with MicroKlenz  3.  Apply small piece of Aquacel Ag along wound  4.  Ok to apply Interdry along groin folds also PRN if skin moist or rashy, keep in a flat layer     Penile meatus: no wound cares needed; continue meticulous catheter and perineal cares and ensure catheter is secured appropriately, not pulling on meatus                General info for SNF    Length of Stay Estimate: Long Term Care  Condition at Discharge: Stable  Level of care:board and care  Rehabilitation Potential: Fair  Admission H&P remains valid and up-to-date: Yes  Recent Chemotherapy: N/A  Use Nursing Home Standing Orders: N/A     Mantoux instructions    Give two-step Mantoux (PPD) Per Facility Policy Yes     Reason for your hospital stay    Advanced multiple sclerosis and need for living facility     Follow Up and recommended labs and tests    Follow up with Nursing home physician.  No follow up labs or test are needed.    Needs to establish with local neurologist for the MS     Activity - Up with nursing assistance     Physical Therapy Adult Consult     Evaluate and treat as clinically indicated.    Reason:  MS     Occupational Therapy Adult Consult    Evaluate and treat as clinically indicated.    Reason:  MS     Advance Diet as Tolerated    Follow this diet upon discharge: Orders Placed This Encounter      Room Service      Mechanical/Dental Soft Diet       Significant Results and Procedures   Most Recent 3 CBC's:  Recent Labs   Lab Test 05/27/21  0133   WBC 4.7   HGB 16.8   MCV 83        Most Recent 3 BMP's:  Recent Labs   Lab Test 06/15/21  0800 06/05/21  0621 05/27/21  0133    136 138   POTASSIUM 4.0 3.9 3.8   CHLORIDE 104 103 102   CO2 27 28 31   BUN 12 15 11   CR 0.80 0.79 0.98   ANIONGAP 4 5 5   SALINAS 9.4 9.3 9.5   GLC 82 77 66*     Most Recent 2 LFT's:  Recent Labs   Lab Test 06/16/21  0756 06/02/21  0846   AST 20 21   ALT 32 24   ALKPHOS 90 86   BILITOTAL 0.3 0.6   , No results found for this or any previous visit.    Discharge Medications   Current Discharge Medication List      START taking these medications    Details   acetaminophen (TYLENOL) 325 MG tablet Take 2 tablets (650 mg) by mouth every 4 hours as needed for mild pain  Qty:      Associated Diagnoses: Multiple sclerosis (H)      !! pregabalin (LYRICA) 150 MG capsule Take 1 capsule (150 mg) by mouth At Bedtime  Qty: 30 capsule, Refills: 0    Associated Diagnoses: Multiple sclerosis (H)      !! pregabalin (LYRICA) 150 MG capsule Take 1 capsule (150 mg) by mouth 2 times daily  Qty: 60 capsule, Refills: 0    Associated Diagnoses: Multiple sclerosis (H)       !! - Potential duplicate medications found. Please discuss with provider.      CONTINUE these medications which have CHANGED    Details   !! baclofen (LIORESAL) 10 MG tablet Take 1 tablet (10 mg) by mouth 3 times daily  Qty:      Associated Diagnoses: Multiple sclerosis (H)      !! baclofen (LIORESAL) 10 MG tablet Take 1 tablet (10 mg) by mouth 2 times daily as needed for muscle spasms  Qty:      Associated Diagnoses: Multiple  sclerosis (H)      oxyCODONE (ROXICODONE) 5 MG tablet Take 1 tablet (5 mg) by mouth 2 times daily as needed  Qty: 10 tablet, Refills: 0    Associated Diagnoses: Multiple sclerosis (H)       !! - Potential duplicate medications found. Please discuss with provider.      CONTINUE these medications which have NOT CHANGED    Details   doxycycline hyclate (VIBRAMYCIN) 100 MG capsule Take 100 mg by mouth daily      DULoxetine (CYMBALTA) 30 MG capsule Take 30 mg by mouth daily      fingolimod (GILENYA) 0.5 MG capsule Take 0.5 mg by mouth daily      furosemide (LASIX) 20 MG tablet Take 20 mg by mouth daily      lactulose encephalopathy (CHRONULAC) 10 GM/15ML SOLUTION Take 30 mLs by mouth daily      levETIRAcetam (KEPPRA) 500 MG tablet Take 500 mg by mouth every 12 hours      naproxen (NAPROSYN) 500 MG tablet Take 500 mg by mouth every 12 hours as needed          STOP taking these medications       gabapentin (NEURONTIN) 600 MG tablet Comments:   Reason for Stopping:         NALTREXONE HCL PO Comments:   Reason for Stopping:         potassium chloride ER (MICRO-K) 10 MEQ CR capsule Comments:   Reason for Stopping:             Allergies   No Known Allergies

## 2021-06-21 NOTE — PLAN OF CARE
Pt A&Ox4. VSS on RA. Dressing to LLE and RLE c/d/i. Interdry to groin. Turn side to side overnight. Pain managed with PRN baclofen x1. No IV access. Will continue to monitor.

## 2021-06-21 NOTE — PLAN OF CARE
Patient discharged to LTC facility this evening via [facility] ride. All belongings and medications returned and sent with patient. Electronic chair with patient as well.

## 2021-06-21 NOTE — PROGRESS NOTES
Care Management Discharge Note    Discharge Date: 06/21/21  Expected Time of Departure: [facility] transport today 1845    Discharge Disposition: Long Term Care    Discharge Services: None    Discharge DME: None    Discharge Transportation: health plan transportation    Private pay costs discussed: transportation costs    PAS Confirmation Code: [id number]  Patient/family educated on Medicare website which has current facility and service quality ratings: yes    Education Provided on the Discharge Plan:    Persons Notified of Discharge Plans: yes  Patient/Family in Agreement with the Plan: yes    Handoff Referral Completed: No    Additional Information:  Greg at [facility] accepted patient today. Writer spoke to patient who agrees. Call placed to [facility] transport and wheelchair ride, using patient's own chair, today at 1845. They will call if an earlier time is available. Facility and bedside nurse updated.     Update: orders received for discharge today and faxed. PAS completed.     PAS-RR    D: Per DHS regulation, SW completed and submitted PAS-RR to [place] [employer] Direct Connect via the Senior LinkAge Line.  PAS-RR confirmation # is : [id number]    I: SW spoke with patient and they are aware a PAS-RR has been submitted.  SW reviewed with patient that they may be contacted for a follow up appointment within 10 days of hospital discharge if their SNF stay is < 30 days.  Contact information for Senior LinkAge Line was also provided.    A: patient verbalized understanding.    P: Further questions may be directed to [facility] LinkAge Line at #[phone], option #4 for PAS-RR staff.          CORWIN Gutierrez

## 2021-12-21 NOTE — PLAN OF CARE
Date/Time 6/17/21 5474-5240    Trauma/Ortho/Medical (Choose one) Medical    Diagnosis: Multiple sclerosis  POD#: NA  Mental Status: A & O x 4  Activity/dangle A x 2/lift. WC bound at baseline.  Diet: Mechanical soft  Pain: Denies  Helm/Voiding: Helm in place  Tele/Restraints/Iso: NA  02/LDA: No IV access  D/C Date: Discharge pending insurance and placement.   Other Info: VSS on RA ex HTN. Wounds to heels, toes, L lateral calf. WOC, SW following.    I called patient to begin Telehealth Encounter. Patient consents to receive services via telehealth technology and understands the visit is a billable healthcare service.

## 2023-01-01 ENCOUNTER — MEDICAL CORRESPONDENCE (OUTPATIENT)
Dept: HEALTH INFORMATION MANAGEMENT | Facility: CLINIC | Age: 43
End: 2023-01-01
Payer: COMMERCIAL

## 2023-01-01 ENCOUNTER — TRANSCRIBE ORDERS (OUTPATIENT)
Dept: OTHER | Age: 43
End: 2023-01-01

## 2023-01-01 ENCOUNTER — OFFICE VISIT (OUTPATIENT)
Dept: NEUROLOGY | Facility: CLINIC | Age: 43
End: 2023-01-01
Attending: PSYCHIATRY & NEUROLOGY
Payer: COMMERCIAL

## 2023-01-01 ENCOUNTER — PRE VISIT (OUTPATIENT)
Dept: NEUROLOGY | Facility: CLINIC | Age: 43
End: 2023-01-01
Payer: COMMERCIAL

## 2023-01-01 VITALS — HEART RATE: 89 BPM | OXYGEN SATURATION: 100 % | SYSTOLIC BLOOD PRESSURE: 100 MMHG | DIASTOLIC BLOOD PRESSURE: 69 MMHG

## 2023-01-01 DIAGNOSIS — G82.20 PARAPLEGIA (H): ICD-10-CM

## 2023-01-01 DIAGNOSIS — G35 MS (MULTIPLE SCLEROSIS) (H): Primary | ICD-10-CM

## 2023-01-01 DIAGNOSIS — G82.50 QUADRIPARESIS (H): ICD-10-CM

## 2023-01-01 DIAGNOSIS — Z86.69 H/O TONIC-CLONIC SEIZURES: ICD-10-CM

## 2023-01-01 DIAGNOSIS — R25.2 SPASTICITY: ICD-10-CM

## 2023-01-01 DIAGNOSIS — G35 PRIMARY CHRONIC PROGRESSIVE MULTIPLE SCLEROSIS (H): Primary | ICD-10-CM

## 2023-01-01 PROCEDURE — 99215 OFFICE O/P EST HI 40 MIN: CPT | Performed by: PSYCHIATRY & NEUROLOGY

## 2023-01-01 PROCEDURE — G0463 HOSPITAL OUTPT CLINIC VISIT: HCPCS | Performed by: PSYCHIATRY & NEUROLOGY

## 2023-01-01 RX ORDER — FERROUS SULFATE 325(65) MG
325 TABLET ORAL
COMMUNITY
Start: 2023-01-01 | End: 2023-01-01

## 2023-01-01 RX ORDER — CEPHALEXIN 500 MG/1
500 CAPSULE ORAL EVERY 12 HOURS
COMMUNITY
Start: 2023-01-01 | End: 2023-01-01

## 2023-01-01 RX ORDER — BISACODYL 10 MG
10 SUPPOSITORY, RECTAL RECTAL
COMMUNITY
Start: 2022-02-14

## 2023-01-01 RX ORDER — NORTRIPTYLINE HCL 25 MG
25 CAPSULE ORAL
COMMUNITY

## 2023-01-01 RX ORDER — FLUOCINOLONE ACETONIDE 0.11 MG/ML
OIL TOPICAL
COMMUNITY
Start: 2023-01-01

## 2023-01-01 RX ORDER — KETOCONAZOLE 20 MG/ML
SHAMPOO TOPICAL
COMMUNITY

## 2023-01-01 RX ORDER — VITAMIN B COMPLEX
2000 TABLET ORAL
COMMUNITY

## 2023-01-01 RX ORDER — PETROLATUM 42 G/100G
OINTMENT TOPICAL
COMMUNITY
Start: 2023-01-01

## 2023-01-01 RX ORDER — AMOXICILLIN 250 MG
1 CAPSULE ORAL DAILY
COMMUNITY

## 2023-01-01 RX ORDER — CAPSAICIN 0.025 %
CREAM (GRAM) TOPICAL
COMMUNITY

## 2023-01-01 ASSESSMENT — PAIN SCALES - GENERAL: PAINLEVEL: EXTREME PAIN (9)

## 2023-07-24 NOTE — TELEPHONE ENCOUNTER
Action 7/24/23 MV 12.29pm   Action Taken Imaging request faxed to [facility]     Action 7/25/23 MV 9.22am   Action Taken Images resolved in PACS     RECORDS RECEIVED FROM: internal   REASON FOR VISIT: Primary chronic progressive multiple sclerosis    Date of Appt: 8/2/23   NOTES (FOR ALL VISITS) STATUS DETAILS   OFFICE NOTE from referring provider Care Everywhere Yamileth ARIAS @  Neurology:  7/10/23   OFFICE NOTE from other specialist Care Everywhere Dr Ana Polo @  Neurology:  1/17/23    Dr Lesly Huang @  Neurology:  9/14/21   DISCHARGE SUMMARY from hospital Internal [facility]:  5/27/21-6/21/21   MEDICATION LIST Care Everywhere    IMAGING  (FOR ALL VISITS)     MRI (HEAD, NECK, SPINE) PACS [facility]:  MRI Cervical Spine 2/10/23  MRI Brain 2/10/23  MRI Cervical Spine 11/9/21  MRI Brain 11/9/21   CT (HEAD, NECK, SPINE) PACS [facility]:  CT Head 9/21/21

## 2023-12-01 NOTE — PATIENT INSTRUCTIONS
1) it looks like gilenya is working for you   Continue gilenya     2) muscle spasms, pain   Increase baclofen to 20 mg three times per day   Let me know if the increased dose makes you more sleepy or confused    Work with physical therapy - [facility] to the [facility]    3) history of seizure???   Continue keppra for now   EEG to look for sign of seizure   If this returns okay, then we will consider taking away keppra (it can slow down brain activity, so I only want you to take it if you truly need it)     4) stop vitamin D   This could be increasing your risk of kidney stones     Follow up with me in 4 months

## 2023-12-01 NOTE — LETTER
12/1/2023       RE: Rahul Ruffin  [address]  [address]  [place] [address]     Dear Colleague,    Thank you for referring your patient, Rahul Ruffin, to the [facility]ULTIPLE [facility] CLINIC [place] [facility]. Please see a copy of my visit note below.    Date of Service: 12/1/2023    [facility] Neurology   MS Clinic Evaluation    Subjective: 43-year-old man with chronic multiple sclerosis who presents for evaluation.  I have evaluated him in the past.  His single evaluation with me took place in 2021.    He is accompanied by a care provider, Nichol, from his nursing facility.  However, the patient's mother is considering taking over his care.    He has been wheelchair-bound for over a decade.  At his initial clinic visit with me he indicated that his MS started in his late teenage years.  He stated that he was wheelchair-bound within 1 year of diagnosis.  However once records were obtained it appeared that he may not have been diagnosed until 2010.    Regardless, he has been wheelchair-bound for a long time.  He does maintain use of his upper extremities.    His primary concern today is pain/spasms.  It is localized to the bilateral feet and calves.  He describes this as throbbing in nature and intermittent, but is not position related.  He does have some burning in his feet, though notes that the throbbing pain is more bothersome to him.    He has intermittent spasms.  These last for 1 to 2 minutes at a time, but when the spasm occurs they are more likely to recur multiple times.  They are typically extensor spasms.  They can affect his arms and legs/feet.  They can be quite painful.  The pain lasts longer than the spasm itself.  They can be brought on by position changes and dressing.  He does currently take baclofen 10 mg 3 times per day.  He is unaware of any side effects from this medication.    The patient does not recall his seizure  history.  He is currently taking Keppra.  He was on this medication when I initially evaluated him.  They were not able to find any documentation of a discrete seizure in his outside records.  Patient himself describes his seizures as shaking and vibratory sensations of his upper extremities.  He states that 1 occurred as recent as 2 days ago.  Nichol is unaware of him having a seizure.  She does note that he has had episodes of altered mental status, that seem to correspond to when he was hospitalized.    He is taking Gilenya for prevention of new MS lesions.  He seems to tolerate this well.    He has struggled with infections.  Chart review indicates that he has been hospitalized 4 times for severe infections over the past year.      Disease onset: age [age]  Wheelchair bound w/in one year of diagnosis     DMD hx:   tecfiderbogdan Mac  gilenya    No Known Allergies    Current Outpatient Medications   Medication    acetaminophen (TYLENOL) 325 MG tablet    AMINO ACIDS-PROTEIN HYDROLYS PO    baclofen (LIORESAL) 10 MG tablet    bisacodyl (DULCOLAX) 10 MG suppository    capsaicin (ZOSTRIX) 0.025 % external cream    cephALEXin (KEFLEX) 500 MG capsule    DULoxetine (CYMBALTA) 30 MG capsule    Emollient (HYDROPHOR) OINT    ferrous sulfate (FEROSUL) 325 (65 Fe) MG tablet    fingolimod (GILENYA) 0.5 MG capsule    fluocinolone acetonide (DERMA SMOOTHE/FS BODY) 0.01 % external oil    furosemide (LASIX) 20 MG tablet    ketoconazole (NIZORAL) 2 % external shampoo    lactulose encephalopathy (CHRONULAC) 10 GM/15ML SOLUTION    levETIRAcetam (KEPPRA) 500 MG tablet    naproxen (NAPROSYN) 500 MG tablet    nortriptyline (PAMELOR) 25 MG capsule    oxyCODONE (ROXICODONE) 5 MG tablet    pregabalin (LYRICA) 150 MG capsule    pregabalin (LYRICA) 150 MG capsule    senna-docusate (SENOKOT-S/PERICOLACE) 8.6-50 MG tablet    Vitamin D3 (CHOLECALCIFEROL) 25 mcg (1000 units) tablet    doxycycline hyclate (VIBRAMYCIN) 100 MG capsule     No  current facility-administered medications for this visit.        Past medical, surgical, social and family history was personally reviewed. Pertinent details noted above.     Physical Examination:   /69 (BP Location: Right arm, Patient Position: Sitting, Cuff Size: Adult Regular)   Pulse 89   SpO2 100%     General: no acute distress  Cranial nerves:   VFFC  Pupils are midsize and mildly reactive  Gaze dysconjugate   Left eye with cn3 palsy   ?L LUKE   Face symmetric  Hearing intact  No dysarthria   Motor:   Tone is increased in the upper extremities,r educed in the lower extremities w some contractures of the ankles  Bulk is reduced in the LE    R L  Deltoid  3 5  Biceps  4+ 5  Triceps  4+ 5  Wrist ext 4+ 5  Finger ext 4+ 5  Finger abd 4+ 5    Hip flexion 0 0  Knee flexion 0 0  Knee ext 0 0  Ankle d/f 0 0    Reflexes: reduced throughout, babinski absent bilaterally  Sensory: vibration is moderately reduced in the toes, JPS moderately reduced in the toes   Coordination: severe ataxia of LUE > RUE   Gait: wcb    Tests/Imaging:   Vitamin D 30  JCV Ab unk    Abs Lymph 800  Hepatic function wnl     MRI Brain  11/2021-moderate to severe lesion burden with moderate brainstem lesion burden left lat  pontine lesion and right pontomedullary lesion, generalized atrophy, gd-   2/2023 - no definite new lesions, gd-     MRI Cervical spine   11/2021 - high lesion burden with upper cervical cord lesions on both the right and left lateral cord, gd-  2/2023 - no definite new lesions, gd-     MRI Thoracic spine   Not done    Assessment: 43-year-old man with chronic multiple sclerosis who is paraplegic with a quadriparesis.  He also has spasticity, though reflexes are reduced because of chronic baclofen use.    I do think that he would benefit from increasing the dose of baclofen to help prevent some of the spasms.  I counseled him on the risk of sedation and confusion as a side effect.    I have a question as to whether or not  the Keppra is truly needed.  The episode that he describes as a seizure may have been paroxysmal spasms associated with multiple sclerosis.  I recommended he undergo an EEG.  If this negative for seizure activity I will  taper him off of Keppra.    I do think that he would benefit from working with physical therapy.  This can be helpful for management of pain, maintaining range of motion, and preventing dependent edema.  He is a good candidate for the MS [facility].  He seemed interested in pursuing this.  Referral to physical therapy was placed    I recommended that he continue with Gilenya as this seems to have been effective for preventing additional lesions.  However I do have some concerns about the immune suppression associated with this given that he has had 4 significant hospitalizations in the past year.  I did recommend that he discontinue vitamin D as it looks like some of his hospitalizations were for sepsis secondary to a complicated UTI in the setting of nephrolithiasis.  Persistent nephrolithiasis puts him at increased risk for infection.  The vitamin D does increase his risk of kidney stones.    Plan:   -Referral to physical therapy  - Continue Gilenya  - Baclofen 20 mg 3 times per day  - EEG  - Continue Keppra, pregabalin, duloxetine and nortriptyline, though I did share my concern about the number of CNS active medications  - Follow-up in 4 months    Note was completed with the assistance of Dragon Fluency software which can often result in accidental word substitutions.     A total of 60 minutes on the date of service were spent in the care of this patient.   Lesly Huang MD on 12/1/2023 at 11:09 AM      Again, thank you for allowing me to participate in the care of your patient.      Sincerely,    Lesly Huang MD

## 2023-12-01 NOTE — NURSING NOTE
Chief Complaint   Patient presents with    MS    New Patient     Establishing MS Care      Vitals were taken and medications were reconciled.   oCsmo Pierre, EMT  11:19 AM

## 2023-12-01 NOTE — PROGRESS NOTES
Date of Service: 12/1/2023    [facility] Neurology   MS Clinic Evaluation    Subjective: 43-year-old man with chronic multiple sclerosis who presents for evaluation.  I have evaluated him in the past.  His single evaluation with me took place in 2021.    He is accompanied by a care provider, Nichol, from his nursing facility.  However, the patient's mother is considering taking over his care.    He has been wheelchair-bound for over a decade.  At his initial clinic visit with me he indicated that his MS started in his late teenage years.  He stated that he was wheelchair-bound within 1 year of diagnosis.  However once records were obtained it appeared that he may not have been diagnosed until 2010.    Regardless, he has been wheelchair-bound for a long time.  He does maintain use of his upper extremities.    His primary concern today is pain/spasms.  It is localized to the bilateral feet and calves.  He describes this as throbbing in nature and intermittent, but is not position related.  He does have some burning in his feet, though notes that the throbbing pain is more bothersome to him.    He has intermittent spasms.  These last for 1 to 2 minutes at a time, but when the spasm occurs they are more likely to recur multiple times.  They are typically extensor spasms.  They can affect his arms and legs/feet.  They can be quite painful.  The pain lasts longer than the spasm itself.  They can be brought on by position changes and dressing.  He does currently take baclofen 10 mg 3 times per day.  He is unaware of any side effects from this medication.    The patient does not recall his seizure history.  He is currently taking Keppra.  He was on this medication when I initially evaluated him.  They were not able to find any documentation of a discrete seizure in his outside records.  Patient himself describes his seizures as shaking and vibratory sensations of his upper extremities.  He states that 1 occurred as recent  as 2 days ago.  Nichol is unaware of him having a seizure.  She does note that he has had episodes of altered mental status, that seem to correspond to when he was hospitalized.    He is taking Gilenya for prevention of new MS lesions.  He seems to tolerate this well.    He has struggled with infections.  Chart review indicates that he has been hospitalized 4 times for severe infections over the past year.      Disease onset: age 18/17  Wheelchair bound w/in one year of diagnosis     DMD hx:   tecfiderbogdan Mac  gilenya    No Known Allergies    Current Outpatient Medications   Medication    acetaminophen (TYLENOL) 325 MG tablet    AMINO ACIDS-PROTEIN HYDROLYS PO    baclofen (LIORESAL) 10 MG tablet    bisacodyl (DULCOLAX) 10 MG suppository    capsaicin (ZOSTRIX) 0.025 % external cream    cephALEXin (KEFLEX) 500 MG capsule    DULoxetine (CYMBALTA) 30 MG capsule    Emollient (HYDROPHOR) OINT    ferrous sulfate (FEROSUL) 325 (65 Fe) MG tablet    fingolimod (GILENYA) 0.5 MG capsule    fluocinolone acetonide (DERMA SMOOTHE/FS BODY) 0.01 % external oil    furosemide (LASIX) 20 MG tablet    ketoconazole (NIZORAL) 2 % external shampoo    lactulose encephalopathy (CHRONULAC) 10 GM/15ML SOLUTION    levETIRAcetam (KEPPRA) 500 MG tablet    naproxen (NAPROSYN) 500 MG tablet    nortriptyline (PAMELOR) 25 MG capsule    oxyCODONE (ROXICODONE) 5 MG tablet    pregabalin (LYRICA) 150 MG capsule    pregabalin (LYRICA) 150 MG capsule    senna-docusate (SENOKOT-S/PERICOLACE) 8.6-50 MG tablet    Vitamin D3 (CHOLECALCIFEROL) 25 mcg (1000 units) tablet    doxycycline hyclate (VIBRAMYCIN) 100 MG capsule     No current facility-administered medications for this visit.        Past medical, surgical, social and family history was personally reviewed. Pertinent details noted above.     Physical Examination:   /69 (BP Location: Right arm, Patient Position: Sitting, Cuff Size: Adult Regular)   Pulse 89   SpO2 100%     General: no acute  distress  Cranial nerves:   VFFC  Pupils are midsize and mildly reactive  Gaze dysconjugate   Left eye with cn3 palsy   ?L LUKE   Face symmetric  Hearing intact  No dysarthria   Motor:   Tone is increased in the upper extremities,r educed in the lower extremities w some contractures of the ankles  Bulk is reduced in the LE    R L  Deltoid  3 5  Biceps  4+ 5  Triceps  4+ 5  Wrist ext 4+ 5  Finger ext 4+ 5  Finger abd 4+ 5    Hip flexion 0 0  Knee flexion 0 0  Knee ext 0 0  Ankle d/f 0 0    Reflexes: reduced throughout, babinski absent bilaterally  Sensory: vibration is moderately reduced in the toes, JPS moderately reduced in the toes   Coordination: severe ataxia of LUE > RUE   Gait: wcb    Tests/Imaging:   Vitamin D 30  JCV Ab unk    Abs Lymph 800  Hepatic function wnl     MRI Brain  11/2021-moderate to severe lesion burden with moderate brainstem lesion burden left lat  pontine lesion and right pontomedullary lesion, generalized atrophy, gd-   2/2023 - no definite new lesions, gd-     MRI Cervical spine   11/2021 - high lesion burden with upper cervical cord lesions on both the right and left lateral cord, gd-  2/2023 - no definite new lesions, gd-     MRI Thoracic spine   Not done    Assessment: 43-year-old man with chronic multiple sclerosis who is paraplegic with a quadriparesis.  He also has spasticity, though reflexes are reduced because of chronic baclofen use.    I do think that he would benefit from increasing the dose of baclofen to help prevent some of the spasms.  I counseled him on the risk of sedation and confusion as a side effect.    I have a question as to whether or not the Keppra is truly needed.  The episode that he describes as a seizure may have been paroxysmal spasms associated with multiple sclerosis.  I recommended he undergo an EEG.  If this negative for seizure activity I will  taper him off of Keppra.    I do think that he would benefit from working with physical therapy.  This can be  helpful for management of pain, maintaining range of motion, and preventing dependent edema.  [facility] is a [facility] for [facility].  He seemed interested in pursuing this.  Referral to physical therapy was placed    I recommended that he continue with Gilenya as this seems to have been effective for preventing additional lesions.  However I do have some concerns about the immune suppression associated with this given that he has had 4 significant hospitalizations in the past year.  I did recommend that he discontinue vitamin D as it looks like some of his hospitalizations were for sepsis secondary to a complicated UTI in the setting of nephrolithiasis.  Persistent nephrolithiasis puts him at increased risk for infection.  The vitamin D does increase his risk of kidney stones.    Plan:   -Referral to physical therapy  - Continue Gilenya  - Baclofen 20 mg 3 times per day  - EEG  - Continue Keppra, pregabalin, duloxetine and nortriptyline, though I did share my concern about the number of CNS active medications  - Follow-up in 4 months    Note was completed with the assistance of Dragon Fluency software which can often result in accidental word substitutions.     A total of 60 minutes on the date of service were spent in the care of this patient.   Lesly Huang MD on 12/1/2023 at 11:09 AM

## 2025-06-24 ENCOUNTER — MEDICAL CORRESPONDENCE (OUTPATIENT)
Dept: HEALTH INFORMATION MANAGEMENT | Facility: CLINIC | Age: 45
End: 2025-06-24
Payer: COMMERCIAL

## (undated) NOTE — MR AVS SNAPSHOT
[address] [address]  [phone]               Thank you for choosing us for your health care visit with Yue Donnelly MD.  We are glad to serve you and happy to provide you with this carrasco Benzoyl Peroxide 10 % Liqd   Apply topically. Apply to face and groin four times a day for acne. Clindamycin Phosphate 1 % Gel   Apply to face and groin topically two times a day. Doxycycline Hyclate 100 MG Tabs   2 (two) times daily. Educational Information     TOP FALL PREVENTION TIPS    INSIDE YOUR HOME   KITCHEN:  ? Use non skid mats only. ? Clean up spills as soon as they happen. ? Keep objects that you use often within easy reach.   BATHROOM:  ? Install grab bars on the bathroo Make half your plate fruits and vegetables Highly refined, white starches including white bread, rice and pasta   Eat plenty of protein, keep the fat content low Sugars:  sodas and sports drinks, candies and desserts   Eat plenty of low-fat dairy products

## (undated) NOTE — MR AVS SNAPSHOT
[address] [address]  [phone]               Thank you for choosing us for your health care visit with Miguel Rivero MD.  We are glad to serve you and happy to provide you with this carrasco Side effects that you should report to your doctor or health care professional as soon as possible:  · allergic reactions like skin rash, itching or hives, swelling of the face, lips, or tongue  · worsening of mood, thoughts or actions of suicide or dying · suicidal thoughts, plans, or attempt; a previous suicide attempt by you or a family member  · an unusual or allergic reaction to gabapentin, other medicines, foods, dyes, or preservatives  · pregnant or trying to get pregnant  · breast-feeding  What sarkis [phone]. This registry collects information about the safety of antiepileptic drug use during pregnancy. Date Last Reviewed:   NOTE:This sheet is a summary. It may not cover all possible information.  If you have questions about this medicine, talk These medications were sent to [address], [address] [phone], [address]Henrry Funes, [facility]     Phone:  [phone]    - gabapentin 300 MG [address]

## (undated) NOTE — Clinical Note
April 10, 2017         Alex Dickerson, [address] [place] [id number]      Patient: Renu Michaels   YOB: 1980   Date of Visit: 4/7/2017       Dear Dr. Divina Rod MD,    I saw your patient, Renu Michaels, on 4/7/2017.  Ifeanyi for a little while. He tkkes tramadol 2-3 times s aday. He has gotten xray of his back in the past.   He is on doxycycline . He stsil lfeels he gets uti - he feels he has one now. He might have seen a pain doctor in the last 2 weeks.      He has done

## (undated) NOTE — Clinical Note
4/14/2017              [address] [id number]         Dear Pastor Brito,      It was a pleasure to see your patient , Mr. Vandana Adames  at our [address], [facility] office.   Hi

## (undated) NOTE — Clinical Note
May 4, 2017         Yassine Draper, [address]  [place] [address]      Patient: Juan Myrick   YOB: 1980   Date of Visit: 5/3/2017       Dear Dr. Erum Vallejo MD,    I saw your patient, Juan Myrick, on 5/3/2017.  Enclosed for a little while. He tkkes tramadol 2-3 times s aday. He has gotten xray of his back in the past.   He is on doxycycline . He stsil lfeels he gets uti - he feels he has one now. He might have seen a pain doctor in the last 2 weeks.      He has done would be appropriate. - he should cont. Tramadol as needed for now     2. Hx of MS - on tecfira -     3. rtc in 6 months or as need4d. Summary:  1.  Start gabpentin 300mg at night - ok to titrate to twice a day or three times a day - per dr. Ofelia Ventura